# Patient Record
Sex: MALE | Race: OTHER | HISPANIC OR LATINO | URBAN - METROPOLITAN AREA
[De-identification: names, ages, dates, MRNs, and addresses within clinical notes are randomized per-mention and may not be internally consistent; named-entity substitution may affect disease eponyms.]

---

## 2020-04-09 ENCOUNTER — INPATIENT (INPATIENT)
Facility: HOSPITAL | Age: 50
LOS: 7 days | Discharge: HOME | End: 2020-04-17
Attending: HOSPITALIST | Admitting: HOSPITALIST
Payer: COMMERCIAL

## 2020-04-09 VITALS
RESPIRATION RATE: 32 BRPM | HEART RATE: 133 BPM | SYSTOLIC BLOOD PRESSURE: 133 MMHG | DIASTOLIC BLOOD PRESSURE: 81 MMHG | OXYGEN SATURATION: 100 % | TEMPERATURE: 98 F

## 2020-04-09 LAB
ALBUMIN SERPL ELPH-MCNC: 3.5 G/DL — SIGNIFICANT CHANGE UP (ref 3.3–5)
ALP SERPL-CCNC: 69 U/L — SIGNIFICANT CHANGE UP (ref 40–120)
ALT FLD-CCNC: 40 U/L — SIGNIFICANT CHANGE UP (ref 4–41)
ANION GAP SERPL CALC-SCNC: 17 MMO/L — HIGH (ref 7–14)
AST SERPL-CCNC: 51 U/L — HIGH (ref 4–40)
BASOPHILS # BLD AUTO: 0.03 K/UL — SIGNIFICANT CHANGE UP (ref 0–0.2)
BASOPHILS NFR BLD AUTO: 0.2 % — SIGNIFICANT CHANGE UP (ref 0–2)
BILIRUB SERPL-MCNC: 1.1 MG/DL — SIGNIFICANT CHANGE UP (ref 0.2–1.2)
BUN SERPL-MCNC: 21 MG/DL — SIGNIFICANT CHANGE UP (ref 7–23)
CALCIUM SERPL-MCNC: 9 MG/DL — SIGNIFICANT CHANGE UP (ref 8.4–10.5)
CHLORIDE SERPL-SCNC: 92 MMOL/L — LOW (ref 98–107)
CO2 SERPL-SCNC: 22 MMOL/L — SIGNIFICANT CHANGE UP (ref 22–31)
CREAT SERPL-MCNC: 1.26 MG/DL — SIGNIFICANT CHANGE UP (ref 0.5–1.3)
CRP SERPL-MCNC: 291.3 MG/L — HIGH
D DIMER BLD IA.RAPID-MCNC: 4176 NG/ML — SIGNIFICANT CHANGE UP
EOSINOPHIL # BLD AUTO: 0.07 K/UL — SIGNIFICANT CHANGE UP (ref 0–0.5)
EOSINOPHIL NFR BLD AUTO: 0.5 % — SIGNIFICANT CHANGE UP (ref 0–6)
FERRITIN SERPL-MCNC: 1999 NG/ML — HIGH (ref 30–400)
GLUCOSE SERPL-MCNC: 126 MG/DL — HIGH (ref 70–99)
HCT VFR BLD CALC: 46.4 % — SIGNIFICANT CHANGE UP (ref 39–50)
HGB BLD-MCNC: 15.1 G/DL — SIGNIFICANT CHANGE UP (ref 13–17)
IMM GRANULOCYTES NFR BLD AUTO: 0.7 % — SIGNIFICANT CHANGE UP (ref 0–1.5)
LYMPHOCYTES # BLD AUTO: 1.66 K/UL — SIGNIFICANT CHANGE UP (ref 1–3.3)
LYMPHOCYTES # BLD AUTO: 10.9 % — LOW (ref 13–44)
MCHC RBC-ENTMCNC: 28.7 PG — SIGNIFICANT CHANGE UP (ref 27–34)
MCHC RBC-ENTMCNC: 32.5 % — SIGNIFICANT CHANGE UP (ref 32–36)
MCV RBC AUTO: 88 FL — SIGNIFICANT CHANGE UP (ref 80–100)
MONOCYTES # BLD AUTO: 0.98 K/UL — HIGH (ref 0–0.9)
MONOCYTES NFR BLD AUTO: 6.4 % — SIGNIFICANT CHANGE UP (ref 2–14)
NEUTROPHILS # BLD AUTO: 12.37 K/UL — HIGH (ref 1.8–7.4)
NEUTROPHILS NFR BLD AUTO: 81.3 % — HIGH (ref 43–77)
NRBC # FLD: 0 K/UL — SIGNIFICANT CHANGE UP (ref 0–0)
PLATELET # BLD AUTO: 186 K/UL — SIGNIFICANT CHANGE UP (ref 150–400)
PMV BLD: 13.1 FL — HIGH (ref 7–13)
POTASSIUM SERPL-MCNC: 3.9 MMOL/L — SIGNIFICANT CHANGE UP (ref 3.5–5.3)
POTASSIUM SERPL-SCNC: 3.9 MMOL/L — SIGNIFICANT CHANGE UP (ref 3.5–5.3)
PROT SERPL-MCNC: 8.6 G/DL — HIGH (ref 6–8.3)
RBC # BLD: 5.27 M/UL — SIGNIFICANT CHANGE UP (ref 4.2–5.8)
RBC # FLD: 13.3 % — SIGNIFICANT CHANGE UP (ref 10.3–14.5)
SODIUM SERPL-SCNC: 131 MMOL/L — LOW (ref 135–145)
WBC # BLD: 15.21 K/UL — HIGH (ref 3.8–10.5)
WBC # FLD AUTO: 15.21 K/UL — HIGH (ref 3.8–10.5)

## 2020-04-09 PROCEDURE — 71045 X-RAY EXAM CHEST 1 VIEW: CPT | Mod: 26

## 2020-04-09 RX ORDER — ACETAMINOPHEN 500 MG
650 TABLET ORAL ONCE
Refills: 0 | Status: COMPLETED | OUTPATIENT
Start: 2020-04-09 | End: 2020-04-09

## 2020-04-09 RX ADMIN — Medication 650 MILLIGRAM(S): at 21:05

## 2020-04-09 NOTE — ED ADULT NURSE NOTE - OBJECTIVE STATEMENT
md HPI Objective Statement: 50 y/o male with a PMHx of psoriasis on monthly immunosuppressant injections presents to the ED with c/o x 3 weeks of cough, fever, SOB, and N/V/D. Pt denies any abdominal pain or chest pain. Pt notes approximately x 3 weeks ago return from [place] and states symptoms started around that time. Pt admits to smoking x 1-2 cigarettes a day, however has not smoked in the last week or so.    moved to room 8 -  placed on pulse ox, remains on 100% nrb -tylenol given, iv placed, bloods drawn and sent

## 2020-04-09 NOTE — ED PROVIDER NOTE - OBJECTIVE STATEMENT
48 y/o male with a PMHx of psoriasis on monthly immunosuppressant injections presents to the ED with c/o x 3 weeks of cough, fever, SOB, and N/V/D. Pt denies any abdominal pain or chest pain. Pt notes approximately x 3 weeks ago return from [place] and states symptoms started around that time. Pt admits to smoking x 1-2 cigarettes a day, however has not smoked in the last week or so.

## 2020-04-09 NOTE — ED PROVIDER NOTE - CLINICAL SUMMARY MEDICAL DECISION MAKING FREE TEXT BOX
50 y/o middle age male on monthly immunosuppressant injections for psoriasis p/w x 3-4 weeks of flu like symptoms; concern for COVID. Placed on 4L NC for hypoxia continuing to be tachypneic breathing in the 40s; will continue to monitor and admit. 50 y/o middle age male on monthly immunosuppressant injections for psoriasis p/w x 3-4 weeks of flu like symptoms; concern for COVID. Placed on NRB for hypoxia continuing to be tachypneic breathing in the 40s; will continue to monitor and admit.

## 2020-04-10 DIAGNOSIS — J12.9 VIRAL PNEUMONIA, UNSPECIFIED: ICD-10-CM

## 2020-04-10 DIAGNOSIS — I26.92 SADDLE EMBOLUS OF PULMONARY ARTERY WITHOUT ACUTE COR PULMONALE: ICD-10-CM

## 2020-04-10 DIAGNOSIS — Z29.9 ENCOUNTER FOR PROPHYLACTIC MEASURES, UNSPECIFIED: ICD-10-CM

## 2020-04-10 DIAGNOSIS — Z02.9 ENCOUNTER FOR ADMINISTRATIVE EXAMINATIONS, UNSPECIFIED: ICD-10-CM

## 2020-04-10 DIAGNOSIS — R68.89 OTHER GENERAL SYMPTOMS AND SIGNS: ICD-10-CM

## 2020-04-10 LAB
ALBUMIN SERPL ELPH-MCNC: 3.5 G/DL — SIGNIFICANT CHANGE UP (ref 3.3–5)
ALP SERPL-CCNC: 78 U/L — SIGNIFICANT CHANGE UP (ref 40–120)
ALT FLD-CCNC: 36 U/L — SIGNIFICANT CHANGE UP (ref 4–41)
ANION GAP SERPL CALC-SCNC: 20 MMO/L — HIGH (ref 7–14)
APTT BLD: 101.7 SEC — HIGH (ref 27.5–36.3)
APTT BLD: 177.6 SEC — CRITICAL HIGH (ref 27.5–36.3)
APTT BLD: 42.4 SEC — HIGH (ref 27.5–36.3)
AST SERPL-CCNC: 50 U/L — HIGH (ref 4–40)
BASOPHILS # BLD AUTO: 0.03 K/UL — SIGNIFICANT CHANGE UP (ref 0–0.2)
BASOPHILS NFR BLD AUTO: 0.2 % — SIGNIFICANT CHANGE UP (ref 0–2)
BILIRUB SERPL-MCNC: 1.1 MG/DL — SIGNIFICANT CHANGE UP (ref 0.2–1.2)
BUN SERPL-MCNC: 22 MG/DL — SIGNIFICANT CHANGE UP (ref 7–23)
CALCIUM SERPL-MCNC: 9 MG/DL — SIGNIFICANT CHANGE UP (ref 8.4–10.5)
CHLORIDE SERPL-SCNC: 94 MMOL/L — LOW (ref 98–107)
CO2 SERPL-SCNC: 20 MMOL/L — LOW (ref 22–31)
CREAT SERPL-MCNC: 1.08 MG/DL — SIGNIFICANT CHANGE UP (ref 0.5–1.3)
EOSINOPHIL # BLD AUTO: 0.02 K/UL — SIGNIFICANT CHANGE UP (ref 0–0.5)
EOSINOPHIL NFR BLD AUTO: 0.1 % — SIGNIFICANT CHANGE UP (ref 0–6)
GLUCOSE SERPL-MCNC: 150 MG/DL — HIGH (ref 70–99)
HCT VFR BLD CALC: 38.8 % — LOW (ref 39–50)
HCT VFR BLD CALC: 41.1 % — SIGNIFICANT CHANGE UP (ref 39–50)
HGB BLD-MCNC: 12.9 G/DL — LOW (ref 13–17)
HGB BLD-MCNC: 13.8 G/DL — SIGNIFICANT CHANGE UP (ref 13–17)
IMM GRANULOCYTES NFR BLD AUTO: 0.8 % — SIGNIFICANT CHANGE UP (ref 0–1.5)
INR BLD: 1.65 — HIGH (ref 0.88–1.17)
LACTATE SERPL-SCNC: 1.7 MMOL/L — SIGNIFICANT CHANGE UP (ref 0.5–2)
LDH SERPL L TO P-CCNC: 547 U/L — HIGH (ref 135–225)
LDH SERPL L TO P-CCNC: 669 U/L — HIGH (ref 135–225)
LYMPHOCYTES # BLD AUTO: 1 K/UL — SIGNIFICANT CHANGE UP (ref 1–3.3)
LYMPHOCYTES # BLD AUTO: 7.2 % — LOW (ref 13–44)
MAGNESIUM SERPL-MCNC: 2.7 MG/DL — HIGH (ref 1.6–2.6)
MCHC RBC-ENTMCNC: 28.8 PG — SIGNIFICANT CHANGE UP (ref 27–34)
MCHC RBC-ENTMCNC: 29.1 PG — SIGNIFICANT CHANGE UP (ref 27–34)
MCHC RBC-ENTMCNC: 33.2 % — SIGNIFICANT CHANGE UP (ref 32–36)
MCHC RBC-ENTMCNC: 33.6 % — SIGNIFICANT CHANGE UP (ref 32–36)
MCV RBC AUTO: 86.5 FL — SIGNIFICANT CHANGE UP (ref 80–100)
MCV RBC AUTO: 86.6 FL — SIGNIFICANT CHANGE UP (ref 80–100)
MONOCYTES # BLD AUTO: 0.27 K/UL — SIGNIFICANT CHANGE UP (ref 0–0.9)
MONOCYTES NFR BLD AUTO: 1.9 % — LOW (ref 2–14)
NEUTROPHILS # BLD AUTO: 12.42 K/UL — HIGH (ref 1.8–7.4)
NEUTROPHILS NFR BLD AUTO: 89.8 % — HIGH (ref 43–77)
NRBC # FLD: 0 K/UL — SIGNIFICANT CHANGE UP (ref 0–0)
NRBC # FLD: 0 K/UL — SIGNIFICANT CHANGE UP (ref 0–0)
PHOSPHATE SERPL-MCNC: 3.1 MG/DL — SIGNIFICANT CHANGE UP (ref 2.5–4.5)
PLATELET # BLD AUTO: 177 K/UL — SIGNIFICANT CHANGE UP (ref 150–400)
PLATELET # BLD AUTO: 179 K/UL — SIGNIFICANT CHANGE UP (ref 150–400)
PMV BLD: 12.5 FL — SIGNIFICANT CHANGE UP (ref 7–13)
PMV BLD: 13.5 FL — HIGH (ref 7–13)
POTASSIUM SERPL-MCNC: 5.1 MMOL/L — SIGNIFICANT CHANGE UP (ref 3.5–5.3)
POTASSIUM SERPL-SCNC: 5.1 MMOL/L — SIGNIFICANT CHANGE UP (ref 3.5–5.3)
PROCALCITONIN SERPL-MCNC: 0.28 NG/ML — HIGH (ref 0.02–0.1)
PROCALCITONIN SERPL-MCNC: 0.32 NG/ML — HIGH (ref 0.02–0.1)
PROT SERPL-MCNC: 7.6 G/DL — SIGNIFICANT CHANGE UP (ref 6–8.3)
PROTHROM AB SERPL-ACNC: 19.1 SEC — HIGH (ref 9.8–13.1)
RBC # BLD: 4.48 M/UL — SIGNIFICANT CHANGE UP (ref 4.2–5.8)
RBC # BLD: 4.75 M/UL — SIGNIFICANT CHANGE UP (ref 4.2–5.8)
RBC # FLD: 13.1 % — SIGNIFICANT CHANGE UP (ref 10.3–14.5)
RBC # FLD: 13.2 % — SIGNIFICANT CHANGE UP (ref 10.3–14.5)
SARS-COV-2 RNA SPEC QL NAA+PROBE: SIGNIFICANT CHANGE UP
SODIUM SERPL-SCNC: 134 MMOL/L — LOW (ref 135–145)
TROPONIN T, HIGH SENSITIVITY: < 6 NG/L — SIGNIFICANT CHANGE UP (ref ?–14)
WBC # BLD: 13.85 K/UL — HIGH (ref 3.8–10.5)
WBC # BLD: 14.27 K/UL — HIGH (ref 3.8–10.5)
WBC # FLD AUTO: 13.85 K/UL — HIGH (ref 3.8–10.5)
WBC # FLD AUTO: 14.27 K/UL — HIGH (ref 3.8–10.5)

## 2020-04-10 PROCEDURE — 99223 1ST HOSP IP/OBS HIGH 75: CPT | Mod: GC

## 2020-04-10 PROCEDURE — 71275 CT ANGIOGRAPHY CHEST: CPT | Mod: 26

## 2020-04-10 PROCEDURE — 12345: CPT | Mod: NC

## 2020-04-10 RX ORDER — HEPARIN SODIUM 5000 [USP'U]/ML
5000 INJECTION INTRAVENOUS; SUBCUTANEOUS EVERY 6 HOURS
Refills: 0 | Status: DISCONTINUED | OUTPATIENT
Start: 2020-04-10 | End: 2020-04-14

## 2020-04-10 RX ORDER — HEPARIN SODIUM 5000 [USP'U]/ML
10000 INJECTION INTRAVENOUS; SUBCUTANEOUS ONCE
Refills: 0 | Status: COMPLETED | OUTPATIENT
Start: 2020-04-10 | End: 2020-04-10

## 2020-04-10 RX ORDER — HEPARIN SODIUM 5000 [USP'U]/ML
INJECTION INTRAVENOUS; SUBCUTANEOUS
Qty: 25000 | Refills: 0 | Status: DISCONTINUED | OUTPATIENT
Start: 2020-04-10 | End: 2020-04-14

## 2020-04-10 RX ORDER — SODIUM CHLORIDE 9 MG/ML
500 INJECTION INTRAMUSCULAR; INTRAVENOUS; SUBCUTANEOUS ONCE
Refills: 0 | Status: COMPLETED | OUTPATIENT
Start: 2020-04-10 | End: 2020-04-10

## 2020-04-10 RX ORDER — HEPARIN SODIUM 5000 [USP'U]/ML
10000 INJECTION INTRAVENOUS; SUBCUTANEOUS EVERY 6 HOURS
Refills: 0 | Status: DISCONTINUED | OUTPATIENT
Start: 2020-04-10 | End: 2020-04-14

## 2020-04-10 RX ADMIN — HEPARIN SODIUM 1900 UNIT(S)/HR: 5000 INJECTION INTRAVENOUS; SUBCUTANEOUS at 14:53

## 2020-04-10 RX ADMIN — HEPARIN SODIUM 10000 UNIT(S): 5000 INJECTION INTRAVENOUS; SUBCUTANEOUS at 06:47

## 2020-04-10 RX ADMIN — HEPARIN SODIUM 1600 UNIT(S)/HR: 5000 INJECTION INTRAVENOUS; SUBCUTANEOUS at 21:58

## 2020-04-10 RX ADMIN — HEPARIN SODIUM 2300 UNIT(S)/HR: 5000 INJECTION INTRAVENOUS; SUBCUTANEOUS at 06:48

## 2020-04-10 RX ADMIN — SODIUM CHLORIDE 1000 MILLILITER(S): 9 INJECTION INTRAMUSCULAR; INTRAVENOUS; SUBCUTANEOUS at 05:14

## 2020-04-10 RX ADMIN — HEPARIN SODIUM 0 UNIT(S)/HR: 5000 INJECTION INTRAVENOUS; SUBCUTANEOUS at 13:51

## 2020-04-10 RX ADMIN — Medication 40 MILLIGRAM(S): at 00:21

## 2020-04-10 NOTE — H&P ADULT - NSHPREVIEWOFSYSTEMS_GEN_ALL_CORE
REVIEW OF SYSTEMS:  CONSTITUTIONAL: SEE HPI  EYES: No eye pain, visual disturbances, or discharge  ENT:  No difficulty hearing, tinnitus, vertigo; No sinus or throat pain  NECK: No pain or stiffness  RESPIRATORY: SEE HPI  CARDIOVASCULAR: No chest pain, palpitations, dizziness, or leg swelling  GASTROINTESTINAL: SEE HPI  GENITOURINARY: No dysuria, frequency, hematuria, or incontinence  NEUROLOGICAL: No headaches, memory loss, loss of strength, numbness, or tremors  SKIN: No itching, burning, rashes, or lesions   LYMPH NODES: No enlarged glands  ENDOCRINE: No hot or cold intolerance; No hair loss  MUSCULOSKELETAL: No joint pain or swelling; No muscle, back, or extremity pain  PSYCHIATRIC: No depression, anxiety, mood swings, or difficulty sleeping  HEME/LYMPH: No easy bruising, or bleeding gums  ALLERGY AND IMMUNOLOGIC: No hives or eczema

## 2020-04-10 NOTE — H&P ADULT - HISTORY OF PRESENT ILLNESS
49M PMH psoriasis on monthly immunosuppressant injections of who presents with shortness of breath. Patient has been experiencing with cough productive of phlegm, fever, SOB for the past three week. Patient also experienced multiple episodes of diarrhea during this time. He also experienced chest pain when he coughed. He denies abdominal pain. He recently traveled to [place] in March and developed these symptoms after his arrival. He lives with his wife and three children. His wife and son are currently ill. He came to the ED for an evaluation.    In the ED, he was tachycardic to the 133 and tachypneic to 32. He received solumedrol 40 mg x1 and acetaminophen 650 mg x1.

## 2020-04-10 NOTE — PROGRESS NOTE ADULT - PROBLEM SELECTOR PLAN 3
CXR with patchy ground-glass opacities and RLL consolidation.  - patient on supplemental oxygen, monitor respiratory status closely  - continue with supportive care

## 2020-04-10 NOTE — CONSULT NOTE ADULT - ATTENDING COMMENTS
Pt seen and examined with resident  Fever and cough x 7-10 days, s/p azithro x5d, progressive SOB, CARRERA over last week.  Came to ED for progression of symptoms without any sudden change.  CTA shows extensive bilat and saddle PE  POCUS: suboptimal windows, RV appears about equal in size to LV, no septal bowing or flattening, LVF appears normal  Labs signif for , Ferritin 2000, d dimer 4170  Pt lying flat in stretcher, NARD, RR 18 unlabored, O2 sat on RA 94%, /70, , alert, orientedx3  Extensive bilat and saddle PE likely COVID infection and related hypercoaguability  Hemodynamically stable and not hypoxic  Elevated inflamm markers  - does not require tPA at this time; recommend heparin gtt; if he becomes hemodynamically unstable, can reconsider tPA  - f/u COVID test; if + would consider tocilizumab or Anakinra given elevated inflamm markers and PE's  - TTE, LE dopplers  - low flow O2 via NC to maintain O2sat >89%  Pt does not require MICU admission

## 2020-04-10 NOTE — CONSULT NOTE ADULT - ASSESSMENT
50 yo M PMH of psoriasis p/w fever, cough, SOB found to have elevated CRP, ferritin w/ CTA chest showing extensive b/l PE w/ saddle thrombus and b/l GGO.     - given pt w/ fever, dry cough, elevated inflammatory markers likely PE in setting of hypercoagulabilty from underlying COVID infection  - pt HD stable, BP systolic range 110-130, mild tachycardia  - on arrival pt on NRB sat 100%, on RA sat 92 in NAD  - bedside TTE showed mild RV dilation however no septal wall flattening, no TR, no RV strain   - pt does not require TPA at this time w/o signs of massive PE, should be started on full AC w/ heparin gtt  - pt will need a formal TTE  - monitor on telemetry  - pt does not require MICU care at this time

## 2020-04-10 NOTE — ED ADULT NURSE REASSESSMENT NOTE - NS ED NURSE REASSESS COMMENT FT1
Awaiting transport. Pt stable w/ no retractions noted. Sleeping comfrotably and easily arousible on 15L non rebreather

## 2020-04-10 NOTE — H&P ADULT - ATTENDING COMMENTS
Case discussed in detail with admitting housestaff. In summary, 49M PMH psoriasis on monthly immunosuppressant injections of who presents with shortness of breath, concern for COVID-19 infection, CTA chest also with saddle pulmonary emboli. For PE, will need tele monitoring. Check EKG and TTE. Started on AC (given likely CAILIN, started on Hep gtt for now). Will give 500cc IVF given hyponatremia and elevated Cr. Closely monitoring resp status. Oxygen support as needed. Check COVID pcr and cultures. Low threshold to start hydroxychloroquine once EKG QTS available.

## 2020-04-10 NOTE — H&P ADULT - PROBLEM SELECTOR PLAN 2
CTA chest with extensive bilateral pulmonary emboli with saddle thrombus identified. Patient also tachycardic to 133.  - MICU consulted for evaluation; follow up recs  - follow up EKG and troponins  - will begin heparin gtt for full anticoagulation  - TTE ordered  - monitor on telemetry CTA chest with extensive bilateral pulmonary emboli with saddle thrombus identified. Patient also tachycardic to 133.  - etiology likely 2/2 hypercoagulability in setting of COVID-19 infection vs underlying primary hypercoagulability state  - as per MICU, bedside TTE showed mild RV dilation however no septal wall flattening, no TR, no RV strain; not a MICU candidate at this time  - follow up EKG and troponins  - will begin heparin gtt for full anticoagulation  - TTE ordered  - monitor on telemetry

## 2020-04-10 NOTE — PROVIDER CONTACT NOTE (CRITICAL VALUE NOTIFICATION) - SITUATION
Patient on a heparin drip with a ptt of 177.6 Cheilitis Aggressive Treatment: I recommended application of Vaseline or Aquaphor numerous times a day (as often as every hour) and before going to bed. I also prescribed a topical steroid for twice daily use.

## 2020-04-10 NOTE — PROGRESS NOTE ADULT - SUBJECTIVE AND OBJECTIVE BOX
Patient is a 49y old  Male who presents with a chief complaint of Shortness of breath (10 Apr 2020 03:36)    SUBJECTIVE / OVERNIGHT EVENTS: Pt seen and examined earlier this am, no overnight events, afebrile, pt denies any cough or sob at this time, comfortable on nasal canula oxygen, had some cough earlier with some streaks of blood in it, no other complaints. No bleeding or any other issues reported by nsg.    MEDICATIONS  (STANDING):  heparin  Infusion.  Unit(s)/Hr (23 mL/Hr) IV Continuous <Continuous>    MEDICATIONS  (PRN):  heparin  Injectable 67381 Unit(s) IV Push every 6 hours PRN For aPTT less than 40  heparin  Injectable 5000 Unit(s) IV Push every 6 hours PRN For aPTT between 40 - 57      Vital Signs Last 24 Hrs  T(C): 36.4 (10 Apr 2020 12:37), Max: 37 (10 Apr 2020 00:09)  T(F): 97.6 (10 Apr 2020 12:37), Max: 98.6 (10 Apr 2020 00:09)  HR: 93 (10 Apr 2020 12:37) (93 - 133)  BP: 115/74 (10 Apr 2020 12:37) (115/74 - 133/81)  BP(mean): --  RR: 22 (10 Apr 2020 12:37) (18 - 32)  SpO2: 96% (10 Apr 2020 12:37) (95% - 100%)  CAPILLARY BLOOD GLUCOSE        I&O's Summary      PHYSICAL EXAM:  GENERAL: NAD, well developed  CHEST/LUNG: No respiratory distress, no accessory muscle use, on nasal cannula oxygen  CVS: + tachycardia on vital signs  ABDOMEN: Soft, non tender, Nondistended  EXTREMITIES: 1+ edema b/l LE  PSYCH: Calm  NEUROLOGY: A/Ox3      LABS:                        12.9   14.27 )-----------( 179      ( 10 Apr 2020 12:33 )             38.8     04-10    134<L>  |  94<L>  |  22  ----------------------------<  150<H>  5.1   |  20<L>  |  1.08    Ca    9.0      10 Apr 2020 04:30  Phos  3.1     04-10  Mg     2.7     04-10    TPro  7.6  /  Alb  3.5  /  TBili  1.1  /  DBili  x   /  AST  50<H>  /  ALT  36  /  AlkPhos  78  04-10    PT/INR - ( 10 Apr 2020 04:30 )   PT: 19.1 SEC;   INR: 1.65          PTT - ( 10 Apr 2020 12:33 )  PTT:177.6 SEC            C-Reactive Protein, Serum: 291.3 mg/L (04-09-20 @ 20:15)      D-Dimer Assay, Quantitative: 4176 ng/mL (04-09-20 @ 20:15)    Ferritin, Serum: 1999 ng/mL (04-09-20 @ 20:15)      RADIOLOGY & ADDITIONAL TESTS:    Imaging Personally Reviewed:    Care Discussed with Consultants/Other Providers:

## 2020-04-10 NOTE — CHART NOTE - NSCHARTNOTEFT_GEN_A_CORE
I was called by radiology to report a CTPA that showed a saddle PE including all lobes except the EMORY and evidence of Covid 19 infection.  Pt is currently on a NRB mask.    ICU Vital Signs Last 24 Hrs  T(C): 37 (10 Apr 2020 00:09), Max: 37 (10 Apr 2020 00:09)  T(F): 98.6 (10 Apr 2020 00:09), Max: 98.6 (10 Apr 2020 00:09)  HR: 110 (10 Apr 2020 00:09) (110 - 133)  BP: 117/76 (10 Apr 2020 00:09) (117/76 - 133/81)  BP(mean): --  ABP: --  ABP(mean): --  RR: 18 (10 Apr 2020 00:09) (18 - 32)  SpO2: 98% (10 Apr 2020 00:09) (98% - 100%)    A/P:  Further evaluation to be done by Hospitalist  MICU consulted for evaluation and going to see pt now  Pt weight needed and then IV Heparin as full AC will be ordered unless MICU recommends different management  Possible CTS consult if determined appropriated on further evaluation.  Telemetery  TTE  Close monitoring

## 2020-04-10 NOTE — H&P ADULT - ASSESSMENT
49M PMH psoriasis on monthly immunosuppressant injections of who presents with shortness of breath, concern for COVID-19 infection, CTA chest also with saddle pulmonary emboli, admitted for further management.

## 2020-04-10 NOTE — PROGRESS NOTE ADULT - PROBLEM SELECTOR PLAN 1
Patient presents with complaint of fever, shortness of breath, and diarrhea. WBC 15.21, D-dimer 4176, ferritin 1999, .3.  - COVID-19 PCR is negative, discussed with ID attending Dr. Shirley will repeat covid  - continue with supportive care  - trend CBC daily and monitor vitals

## 2020-04-10 NOTE — H&P ADULT - NSHPSOCIALHISTORY_GEN_ALL_CORE
The patient smokes 1-2 cigarettes per day at times and has done so for the past ten years. He denies drug and alcohol use.

## 2020-04-10 NOTE — CONSULT NOTE ADULT - SUBJECTIVE AND OBJECTIVE BOX
CHIEF COMPLAINT:    HPI:    50 yo M PMH of psoriasis p/w fever, cough, SOB. Pt reports being in normal state of health 2 weeks prior, at that time returned from flight from [place] Stated started having fever 102 max in [place] with worsening dry cough. Last week developed GI symptoms with diarrhea. Was given course of azithromax last week from outside provider without any relief. Pt reports over last few days worsening CARRERA, unable to walk more than 5 feet without SOB, no SOB at rest, no associated CP or palpitations. Denies any positive COVID contacts, works for [employer]. No sore throat, rhinorrhea, N/V. No history of blood clots, reports possible history in grandmother. No recent surgery. No Lower extremity swelling. Pt reports taking Cosentyx medication for psoriasis, last injection around 10 days prior, not first time administering medication. Denies any other pulmonary history (no asthma, KEYANA, COPD) or cardiac history (PCI, valve replacement)    PAST MEDICAL & SURGICAL HISTORY:  Psoriasis  No significant past surgical history      FAMILY HISTORY:  No pertinent family history in first degree relatives      SOCIAL HISTORY:  Smokin-2 cigarettes per day  EtOH Use:  Marital Status:   Occupation:  Recent Travel:  Country of Birth:  Advance Directives:    Allergies    No Known Allergies    Intolerances        HOME MEDICATIONS:    Cosenytx.     CONSTITUTIONAL: No weakness, + fevers or chills  EYES/ENT: No visual changes;  No vertigo or throat pain   NECK: No pain or stiffness  RESPIRATORY: +cough, wheezing, hemoptysis; +shortness of breath  CARDIOVASCULAR: No chest pain or palpitations  GASTROINTESTINAL: No abdominal or epigastric pain. No nausea, vomiting, or hematemesis; No diarrhea or constipation. No melena or hematochezia.  GENITOURINARY: No dysuria, frequency or hematuria  NEUROLOGICAL: No numbness or weakness  SKIN: No itching, burning, rashes, or lesions   All other review of systems is negative unless indicated above.    [ ] All other systems negative  [ ] Unable to assess ROS because ________    OBJECTIVE:  ICU Vital Signs Last 24 Hrs  T(C): 37 (10 Apr 2020 00:09), Max: 37 (10 Apr 2020 00:09)  T(F): 98.6 (10 Apr 2020 00:09), Max: 98.6 (10 Apr 2020 00:09)  HR: 110 (10 Apr 2020 00:09) (110 - 133)  BP: 117/76 (10 Apr 2020 00:09) (117/76 - 133/81)  BP(mean): --  ABP: --  ABP(mean): --  RR: 18 (10 Apr 2020 00:09) (18 - 32)  SpO2: 98% (10 Apr 2020 00:09) (98% - 100%)        CAPILLARY BLOOD GLUCOSE          PHYSICAL EXAM:  General: Awake, alert, oriented X 3.   HEENT: Atraumatic, normocephalic.   Neck: No JVD. No carotid bruit.   Respiratory: CTABL  Cardiovascular: S1 S2 normal.Abdomen: Soft, non-tender, non-distended. No organomegaly.  Extremities: Warm to touch. 1+ pitting edema noted in both lower extremities this am.  Skin: No rashes or skin lesions  Neurological: Motor and sensory examination equal and normal in all four extremities.  Psychiatry: Appropriate mood and affect.  HOSPITAL MEDICATIONS:  MEDICATIONS  (STANDING):    MEDICATIONS  (PRN):      LABS:                        15.1   15. )-----------( 186      ( 2020 20:15 )             46.4     04    131<L>  |  92<L>  |  21  ----------------------------<  126<H>  3.9   |  22  |  1.26    Ca    9.0      2020 20:15    TPro  8.6<H>  /  Alb  3.5  /  TBili  1.1  /  DBili  x   /  AST  51<H>  /  ALT  40  /  AlkPhos  69  -              MICROBIOLOGY:     RADIOLOGY:    < from: CT Angio Chest w/ IV Cont (04.10.20 @ 01:12) >  Patchy/groundglass peripheral bilateral opacities (GGO) with basilar predominance. Suggestion of right lower lobe consolidation. Differential includes atypical pneumonia/viral infection from agents including COVID-19. Correlate with clinical parameters and laboratory values for confirmation.    Extensive bilateral pulmonary emboli with saddle thrombus identified.    < end of copied text >      [x ] Reviewed and interpreted by me    EKG:

## 2020-04-10 NOTE — CHART NOTE - NSCHARTNOTEFT_GEN_A_CORE
pt was COVID negative x 1 -     Sp02 98% on 2L NC pt was asymptomatic laying comfortably supine in bed in NAD   pt was reswabbed for repeat COVID - sample testing - will continue to monitor       Vital Signs Last 24 Hrs  T(C): 36.6 (10 Apr 2020 14:44), Max: 36.7 (10 Apr 2020 04:48)  T(F): 97.8 (10 Apr 2020 14:44), Max: 98 (10 Apr 2020 04:48)  HR: 87 (10 Apr 2020 14:44) (87 - 99)  BP: 122/83 (10 Apr 2020 14:44) (115/74 - 122/83)  BP(mean): --  RR: 20 (10 Apr 2020 14:44) (20 - 22)  SpO2: 98% (10 Apr 2020 14:44) (95% - 98%)

## 2020-04-10 NOTE — H&P ADULT - PROBLEM SELECTOR PLAN 1
Patient presents with complaint of fever, shortness of breath, and diarrhea. WBC 15.21, D-dimer 4176, ferritin 1999, .3.  - follow up LDH and procalcitonin  - follow up COVID-19 PCR  - continue with supportive care  - trend CBC daily and monitor vitals

## 2020-04-10 NOTE — H&P ADULT - NSHPLABSRESULTS_GEN_ALL_CORE
LABS:                          15.1   15.21 )-----------( 186      ( 09 Apr 2020 20:15 )             46.4     Hb Trend: 15.1<--  WBC Trend: 15.21<--  Plt Trend: 186<--          04-09    131<L>  |  92<L>  |  21  ----------------------------<  126<H>  3.9   |  22  |  1.26    Ca    9.0      09 Apr 2020 20:15    TPro  8.6<H>  /  Alb  3.5  /  TBili  1.1  /  DBili  x   /  AST  51<H>  /  ALT  40  /  AlkPhos  69  04-09          CAPILLARY BLOOD GLUCOSE              IMAGING:      EXAM:  XR CHEST PORTABLE IMMED 1V    PROCEDURE DATE:  Apr 9 2020     ******PRELIMINARY REPORT******    ******PRELIMINARY REPORT******          INTERPRETATION:  Patchy/hazy bilateral opacities. Pattern suggests infection including atypical pneumonia/viral infection from atypical agents including COVID-19.    Follow up final report.      EXAM:  CT ANGIO CHEST (W)AW IC    PROCEDURE DATE:  Apr 10 2020     IMPRESSION:     Patchy/groundglass peripheral bilateral opacities (GGO) with basilar predominance. Suggestion of right lower lobe consolidation. Differential includes atypical pneumonia/viral infection from agents including COVID-19. Correlate with clinical parameters and laboratory values for confirmation.    Extensive bilateral pulmonary emboli with saddle thrombus identified.    These findings were discussed with WINSOME Wooten at 4/10/2020 1:14 AM by Dr. Arora with read back confirmation.    Imaging studies reviewed by me.

## 2020-04-10 NOTE — H&P ADULT - NSHPPHYSICALEXAM_GEN_ALL_CORE
Vital Signs Last 24 Hrs  T(C): 37 (10 Apr 2020 00:09), Max: 37 (10 Apr 2020 00:09)  T(F): 98.6 (10 Apr 2020 00:09), Max: 98.6 (10 Apr 2020 00:09)  HR: 110 (10 Apr 2020 00:09) (110 - 133)  BP: 117/76 (10 Apr 2020 00:09) (117/76 - 133/81)  BP(mean): --  RR: 18 (10 Apr 2020 00:09) (18 - 32)  SpO2: 98% (10 Apr 2020 00:09) (98% - 100%)    General: NAD  Head: Normocephalic, Atraumatic  Eyes: PERRLA, EOMI, normal sclera  Throat: Moist mucous membranes  Respiratory: Tachycardic, regular rhythm, normal respiratory effort, no wheezes, crackles, rales  CV: RRR, S1/S2, no murmurs, rubs or gallops  Abdominal: Soft, bowel sounds present, NT, ND  Extremities: No edema, 2+ DP pulses  Neurological: A&Ox4, MAEx4, non-focal  Skin: No rashes Vital Signs Last 24 Hrs  T(C): 37 (10 Apr 2020 00:09), Max: 37 (10 Apr 2020 00:09)  T(F): 98.6 (10 Apr 2020 00:09), Max: 98.6 (10 Apr 2020 00:09)  HR: 110 (10 Apr 2020 00:09) (110 - 133)  BP: 117/76 (10 Apr 2020 00:09) (117/76 - 133/81)  BP(mean): --  RR: 18 (10 Apr 2020 00:09) (18 - 32)  SpO2: 98% (10 Apr 2020 00:09) (98% - 100%)    General: NAD  Head: Normocephalic, Atraumatic  Eyes: PERRLA, EOMI, normal sclera  Throat: Moist mucous membranes  Respiratory: coarse breath sounds, normal respiratory effort, no wheezes, crackles, rales  CV: Tachycardic, regular rhythm, S1/S2, no murmurs, rubs or gallops  Abdominal: Soft, bowel sounds present, NT, ND  Extremities: No edema, 2+ DP pulses  Neurological: A&Ox4, MAEx4, non-focal  Skin: No rashes

## 2020-04-10 NOTE — H&P ADULT - PROBLEM SELECTOR PLAN 3
CXR with patchy ground-glass opacities and RLL consolidation.  - patient not requiring supplemental oxygen  - continue with supportive care

## 2020-04-10 NOTE — PROGRESS NOTE ADULT - PROBLEM SELECTOR PLAN 2
CTA chest with extensive bilateral pulmonary emboli with saddle thrombus identified. Patient also tachycardic  - etiology likely 2/2 hypercoagulability in setting of COVID-19 infection vs underlying primary hypercoagulability state  - as per MICU, bedside TTE showed mild RV dilation however no septal wall flattening, no TR, no RV strain; not a MICU candidate at this time  - follow up EKG and troponins  - cont heparin gtt for full anticoagulation  - TTE and LE doppler pending  - monitor on telemetry awaiting tele bed

## 2020-04-10 NOTE — ED ADULT NURSE REASSESSMENT NOTE - NS ED NURSE REASSESS COMMENT FT1
Pt on non-rebreather. not hypoxic. No retractions. No increased WOB noted. Immediate de-saturation noted off O2. Pt educated on need to remain in place. Will continue to assess Vitals stbale.

## 2020-04-11 LAB
APTT BLD: 76.9 SEC — HIGH (ref 27.5–36.3)
APTT BLD: 85 SEC — HIGH (ref 27.5–36.3)
HCT VFR BLD CALC: 38 % — LOW (ref 39–50)
HGB BLD-MCNC: 12.7 G/DL — LOW (ref 13–17)
MCHC RBC-ENTMCNC: 29 PG — SIGNIFICANT CHANGE UP (ref 27–34)
MCHC RBC-ENTMCNC: 33.4 % — SIGNIFICANT CHANGE UP (ref 32–36)
MCV RBC AUTO: 86.8 FL — SIGNIFICANT CHANGE UP (ref 80–100)
NRBC # FLD: 0 K/UL — SIGNIFICANT CHANGE UP (ref 0–0)
PLATELET # BLD AUTO: 198 K/UL — SIGNIFICANT CHANGE UP (ref 150–400)
PMV BLD: 13.3 FL — HIGH (ref 7–13)
RBC # BLD: 4.38 M/UL — SIGNIFICANT CHANGE UP (ref 4.2–5.8)
RBC # FLD: 13.2 % — SIGNIFICANT CHANGE UP (ref 10.3–14.5)
SARS-COV-2 RNA SPEC QL NAA+PROBE: SIGNIFICANT CHANGE UP
WBC # BLD: 14.29 K/UL — HIGH (ref 3.8–10.5)
WBC # FLD AUTO: 14.29 K/UL — HIGH (ref 3.8–10.5)

## 2020-04-11 PROCEDURE — 99233 SBSQ HOSP IP/OBS HIGH 50: CPT

## 2020-04-11 RX ORDER — CEFTRIAXONE 500 MG/1
1000 INJECTION, POWDER, FOR SOLUTION INTRAMUSCULAR; INTRAVENOUS EVERY 24 HOURS
Refills: 0 | Status: COMPLETED | OUTPATIENT
Start: 2020-04-11 | End: 2020-04-15

## 2020-04-11 RX ORDER — AZITHROMYCIN 500 MG/1
500 TABLET, FILM COATED ORAL DAILY
Refills: 0 | Status: DISCONTINUED | OUTPATIENT
Start: 2020-04-11 | End: 2020-04-15

## 2020-04-11 RX ADMIN — HEPARIN SODIUM 1600 UNIT(S)/HR: 5000 INJECTION INTRAVENOUS; SUBCUTANEOUS at 15:14

## 2020-04-11 RX ADMIN — CEFTRIAXONE 100 MILLIGRAM(S): 500 INJECTION, POWDER, FOR SOLUTION INTRAMUSCULAR; INTRAVENOUS at 16:40

## 2020-04-11 RX ADMIN — HEPARIN SODIUM 1600 UNIT(S)/HR: 5000 INJECTION INTRAVENOUS; SUBCUTANEOUS at 08:41

## 2020-04-11 RX ADMIN — AZITHROMYCIN 500 MILLIGRAM(S): 500 TABLET, FILM COATED ORAL at 17:17

## 2020-04-11 NOTE — PROGRESS NOTE ADULT - SUBJECTIVE AND OBJECTIVE BOX
[facility] Division of Hospital Medicine  Joanne Anderson MD  Pager [phone]      Patient is a 49y old  Male who presents with a chief complaint of Shortness of breath (10 Apr 2020 14:25)      SUBJECTIVE / OVERNIGHT EVENTS:    afebrile o/n. Pt reports mild sob with exertion. Denies chest pain, palpitation, dizziness     ADDITIONAL REVIEW OF SYSTEMS:    RESPIRATORY: No cough, wheezing, chills or hemoptysis; + shortness of breath  CARDIOVASCULAR: No chest pain, palpitations, dizziness, or leg swelling  GASTROINTESTINAL: No abdominal or epigastric pain. No nausea, vomiting, or hematemesis; No diarrhea or constipation. No melena or hematochezia.    MEDICATIONS  (STANDING):  azithromycin   Tablet 500 milliGRAM(s) Oral daily  cefTRIAXone   IVPB 1000 milliGRAM(s) IV Intermittent every 24 hours  heparin  Infusion.  Unit(s)/Hr (23 mL/Hr) IV Continuous <Continuous>    MEDICATIONS  (PRN):  heparin  Injectable 81703 Unit(s) IV Push every 6 hours PRN For aPTT less than 40  heparin  Injectable 5000 Unit(s) IV Push every 6 hours PRN For aPTT between 40 - 57      CAPILLARY BLOOD GLUCOSE        I&O's Summary      PHYSICAL EXAM:  Vital Signs Last 24 Hrs  T(C): 37.2 (11 Apr 2020 09:52), Max: 37.2 (11 Apr 2020 09:52)  T(F): 99 (11 Apr 2020 09:52), Max: 99 (11 Apr 2020 09:52)  HR: 96 (11 Apr 2020 09:52) (96 - 96)  BP: 112/63 (11 Apr 2020 09:52) (112/63 - 112/63)  BP(mean): --  RR: 20 (11 Apr 2020 09:52) (20 - 20)  SpO2: 96% (11 Apr 2020 09:52) (96% - 96%)    CONSTITUTIONAL: NAD, well-developed, well-groomed  EYES: PERRLA; conjunctiva and sclera clear  ENMT: Moist oral mucosa, no pharyngeal injection or exudates;   NECK: Supple, no palpable masses;  RESPIRATORY: Normal respiratory effort; basilar crackles bilaterally  CARDIOVASCULAR: Regular rate and rhythm, normal S1 and S2, no murmur/rub/gallop; No lower extremity edema; Peripheral pulses are 2+ bilaterally  ABDOMEN: Nontender to palpation, normoactive bowel sounds, no rebound/guarding;   MUSCLOSKELETAL:  Normal gait; no clubbing or cyanosis of digits; no joint swelling or tenderness to palpation  PSYCH: A+O to person, place, and time; affect appropriate  NEUROLOGY: CN 2-12 are intact and symmetric; no gross sensory deficits;   SKIN: No rashes; no palpable lesions    LABS:                        12.7   14.29 )-----------( 198      ( 11 Apr 2020 06:30 )             38.0     04-10    134<L>  |  94<L>  |  22  ----------------------------<  150<H>  5.1   |  20<L>  |  1.08    Ca    9.0      10 Apr 2020 04:30  Phos  3.1     04-10  Mg     2.7     04-10    TPro  7.6  /  Alb  3.5  /  TBili  1.1  /  DBili  x   /  AST  50<H>  /  ALT  36  /  AlkPhos  78  04-10    PT/INR - ( 10 Apr 2020 04:30 )   PT: 19.1 SEC;   INR: 1.65          PTT - ( 11 Apr 2020 14:29 )  PTT:85.0 SEC            RADIOLOGY & ADDITIONAL TESTS:  Results Reviewed:   Imaging Personally Reviewed:  Electrocardiogram Personally Reviewed:    COORDINATION OF CARE:  Care Discussed with Consultants/Other Providers [Y/N]:  Prior or Outpatient Records Reviewed [Y/N]:

## 2020-04-11 NOTE — PROGRESS NOTE ADULT - PROBLEM SELECTOR PLAN 3
CXR with patchy ground-glass opacities and RLL consolidation.  -given COVID neg x1 will start abx for possible bacterial PNA  - patient on supplemental oxygen, monitor respiratory status closely  - continue with supportive care

## 2020-04-11 NOTE — PROGRESS NOTE ADULT - ASSESSMENT
49M PMH psoriasis on monthly immunosuppressant injections of who presents with shortness of breath, concern for PNA. CTA chest also with saddle pulmonary emboli, admitted for further management.

## 2020-04-11 NOTE — PROGRESS NOTE ADULT - PROBLEM SELECTOR PLAN 2
CTA chest with extensive bilateral pulmonary emboli with saddle thrombus identified. currently HD stable. mild hypoxia   - as per MICU, bedside TTE showed mild RV dilation however no septal wall flattening, no TR, no RV strain; not a MICU candidate at this time  - given significant clot burdern will cont heparin gtt for AC minimal 3 days  - TTE pending  - will consider switching to NOAC if no R heart strain on echo. pt uninsured. will need SW assistance for meds  - monitor on telemetry

## 2020-04-12 LAB
ANION GAP SERPL CALC-SCNC: 14 MMO/L — SIGNIFICANT CHANGE UP (ref 7–14)
APTT BLD: 107.4 SEC — HIGH (ref 27.5–36.3)
APTT BLD: 52.4 SEC — HIGH (ref 27.5–36.3)
APTT BLD: 70.3 SEC — HIGH (ref 27.5–36.3)
BASOPHILS # BLD AUTO: 0.07 K/UL — SIGNIFICANT CHANGE UP (ref 0–0.2)
BASOPHILS NFR BLD AUTO: 0.7 % — SIGNIFICANT CHANGE UP (ref 0–2)
BUN SERPL-MCNC: 13 MG/DL — SIGNIFICANT CHANGE UP (ref 7–23)
CALCIUM SERPL-MCNC: 8.6 MG/DL — SIGNIFICANT CHANGE UP (ref 8.4–10.5)
CHLORIDE SERPL-SCNC: 93 MMOL/L — LOW (ref 98–107)
CO2 SERPL-SCNC: 25 MMOL/L — SIGNIFICANT CHANGE UP (ref 22–31)
CREAT SERPL-MCNC: 0.81 MG/DL — SIGNIFICANT CHANGE UP (ref 0.5–1.3)
EOSINOPHIL # BLD AUTO: 0.29 K/UL — SIGNIFICANT CHANGE UP (ref 0–0.5)
EOSINOPHIL NFR BLD AUTO: 2.7 % — SIGNIFICANT CHANGE UP (ref 0–6)
GLUCOSE SERPL-MCNC: 102 MG/DL — HIGH (ref 70–99)
HCT VFR BLD CALC: 39.3 % — SIGNIFICANT CHANGE UP (ref 39–50)
HCT VFR BLD CALC: 39.5 % — SIGNIFICANT CHANGE UP (ref 39–50)
HGB BLD-MCNC: 12.7 G/DL — LOW (ref 13–17)
HGB BLD-MCNC: 12.7 G/DL — LOW (ref 13–17)
IMM GRANULOCYTES NFR BLD AUTO: 1.8 % — HIGH (ref 0–1.5)
LYMPHOCYTES # BLD AUTO: 2.65 K/UL — SIGNIFICANT CHANGE UP (ref 1–3.3)
LYMPHOCYTES # BLD AUTO: 25 % — SIGNIFICANT CHANGE UP (ref 13–44)
MAGNESIUM SERPL-MCNC: 2.2 MG/DL — SIGNIFICANT CHANGE UP (ref 1.6–2.6)
MCHC RBC-ENTMCNC: 28.7 PG — SIGNIFICANT CHANGE UP (ref 27–34)
MCHC RBC-ENTMCNC: 28.9 PG — SIGNIFICANT CHANGE UP (ref 27–34)
MCHC RBC-ENTMCNC: 32.2 % — SIGNIFICANT CHANGE UP (ref 32–36)
MCHC RBC-ENTMCNC: 32.3 % — SIGNIFICANT CHANGE UP (ref 32–36)
MCV RBC AUTO: 89.3 FL — SIGNIFICANT CHANGE UP (ref 80–100)
MCV RBC AUTO: 89.4 FL — SIGNIFICANT CHANGE UP (ref 80–100)
MONOCYTES # BLD AUTO: 0.57 K/UL — SIGNIFICANT CHANGE UP (ref 0–0.9)
MONOCYTES NFR BLD AUTO: 5.4 % — SIGNIFICANT CHANGE UP (ref 2–14)
NEUTROPHILS # BLD AUTO: 6.81 K/UL — SIGNIFICANT CHANGE UP (ref 1.8–7.4)
NEUTROPHILS NFR BLD AUTO: 64.4 % — SIGNIFICANT CHANGE UP (ref 43–77)
NRBC # FLD: 0 K/UL — SIGNIFICANT CHANGE UP (ref 0–0)
NRBC # FLD: 0 K/UL — SIGNIFICANT CHANGE UP (ref 0–0)
PLATELET # BLD AUTO: 208 K/UL — SIGNIFICANT CHANGE UP (ref 150–400)
PLATELET # BLD AUTO: 224 K/UL — SIGNIFICANT CHANGE UP (ref 150–400)
PMV BLD: 12.5 FL — SIGNIFICANT CHANGE UP (ref 7–13)
PMV BLD: 12.7 FL — SIGNIFICANT CHANGE UP (ref 7–13)
POTASSIUM SERPL-MCNC: 3.6 MMOL/L — SIGNIFICANT CHANGE UP (ref 3.5–5.3)
POTASSIUM SERPL-SCNC: 3.6 MMOL/L — SIGNIFICANT CHANGE UP (ref 3.5–5.3)
RBC # BLD: 4.4 M/UL — SIGNIFICANT CHANGE UP (ref 4.2–5.8)
RBC # BLD: 4.42 M/UL — SIGNIFICANT CHANGE UP (ref 4.2–5.8)
RBC # FLD: 13.4 % — SIGNIFICANT CHANGE UP (ref 10.3–14.5)
RBC # FLD: 13.6 % — SIGNIFICANT CHANGE UP (ref 10.3–14.5)
SODIUM SERPL-SCNC: 132 MMOL/L — LOW (ref 135–145)
WBC # BLD: 10.22 K/UL — SIGNIFICANT CHANGE UP (ref 3.8–10.5)
WBC # BLD: 10.58 K/UL — HIGH (ref 3.8–10.5)
WBC # FLD AUTO: 10.22 K/UL — SIGNIFICANT CHANGE UP (ref 3.8–10.5)
WBC # FLD AUTO: 10.58 K/UL — HIGH (ref 3.8–10.5)

## 2020-04-12 PROCEDURE — 99233 SBSQ HOSP IP/OBS HIGH 50: CPT

## 2020-04-12 RX ADMIN — HEPARIN SODIUM 1300 UNIT(S)/HR: 5000 INJECTION INTRAVENOUS; SUBCUTANEOUS at 15:54

## 2020-04-12 RX ADMIN — HEPARIN SODIUM 1600 UNIT(S)/HR: 5000 INJECTION INTRAVENOUS; SUBCUTANEOUS at 23:19

## 2020-04-12 RX ADMIN — HEPARIN SODIUM 1300 UNIT(S)/HR: 5000 INJECTION INTRAVENOUS; SUBCUTANEOUS at 09:30

## 2020-04-12 RX ADMIN — CEFTRIAXONE 100 MILLIGRAM(S): 500 INJECTION, POWDER, FOR SOLUTION INTRAMUSCULAR; INTRAVENOUS at 15:25

## 2020-04-12 RX ADMIN — HEPARIN SODIUM 5000 UNIT(S): 5000 INJECTION INTRAVENOUS; SUBCUTANEOUS at 23:22

## 2020-04-12 RX ADMIN — AZITHROMYCIN 500 MILLIGRAM(S): 500 TABLET, FILM COATED ORAL at 15:25

## 2020-04-12 NOTE — PROGRESS NOTE ADULT - PROBLEM SELECTOR PLAN 2
CTA chest with extensive bilateral pulmonary emboli with saddle thrombus identified. currently HD stable. mild hypoxia   - as per MICU, bedside TTE showed mild RV dilation however no septal wall flattening, no TR, no RV strain; not a MICU candidate at this time  - given significant clot burden will cont heparin gtt for AC minimal 3 days  - TTE pending  - will consider switching to NOAC if no R heart strain on echo.   - monitor on telemetry CTA chest with extensive bilateral pulmonary emboli with saddle thrombus identified. currently HD stable. mild hypoxia   - as per MICU, bedside TTE showed mild RV dilation however no septal wall flattening, no TR, no RV strain; not a MICU candidate at this time  - given significant clot burden will cont heparin gtt for AC minimal 3 days  - TTE pending  - will consider switching to NOAC if no R heart strain on echo.   -discussed with pt that he will need to f/u PCP for hypercoagulability w/u and age appropriate cancer screening.   - monitor on telemetry

## 2020-04-12 NOTE — PROGRESS NOTE ADULT - SUBJECTIVE AND OBJECTIVE BOX
[facility] Division of Hospital Medicine  Joanne Anderson MD  Pager [phone]      Patient is a 49y old  Male who presents with a chief complaint of Shortness of breath (11 Apr 2020 15:15)      SUBJECTIVE / OVERNIGHT EVENTS:    No acute event o/n. Pt off O2 satting well. Denies sob, palpitation, chest pain     ADDITIONAL REVIEW OF SYSTEMS:    RESPIRATORY: No cough, wheezing, chills or hemoptysis; No shortness of breath  CARDIOVASCULAR: No chest pain, palpitations, dizziness, or leg swelling  GASTROINTESTINAL: No abdominal or epigastric pain. No nausea, vomiting, or hematemesis; No diarrhea or constipation. No melena or hematochezia.      MEDICATIONS  (STANDING):  azithromycin   Tablet 500 milliGRAM(s) Oral daily  cefTRIAXone   IVPB 1000 milliGRAM(s) IV Intermittent every 24 hours  heparin  Infusion.  Unit(s)/Hr (23 mL/Hr) IV Continuous <Continuous>    MEDICATIONS  (PRN):  heparin  Injectable 01811 Unit(s) IV Push every 6 hours PRN For aPTT less than 40  heparin  Injectable 5000 Unit(s) IV Push every 6 hours PRN For aPTT between 40 - 57      CAPILLARY BLOOD GLUCOSE        I&O's Summary      PHYSICAL EXAM:  Vital Signs Last 24 Hrs  T(C): 36.4 (12 Apr 2020 06:17), Max: 36.4 (12 Apr 2020 06:17)  T(F): 97.5 (12 Apr 2020 06:17), Max: 97.5 (12 Apr 2020 06:17)  HR: 94 (12 Apr 2020 06:17) (94 - 94)  BP: 101/63 (12 Apr 2020 06:17) (101/63 - 101/63)  BP(mean): --  RR: 20 (12 Apr 2020 06:17) (20 - 20)  SpO2: 94% (12 Apr 2020 06:17) (94% - 94%)    CONSTITUTIONAL: NAD, well-developed, well-groomed  EYES: PERRLA; conjunctiva and sclera clear  ENMT: Moist oral mucosa, no pharyngeal injection or exudates;   NECK: Supple, no palpable masses;  RESPIRATORY: Normal respiratory effort; basilar crackles bilaterally  CARDIOVASCULAR: Regular rate and rhythm, normal S1 and S2, no murmur/rub/gallop; No lower extremity edema; Peripheral pulses are 2+ bilaterally  ABDOMEN: Nontender to palpation, normoactive bowel sounds, no rebound/guarding;   MUSCLOSKELETAL:  Normal gait; no clubbing or cyanosis of digits; no joint swelling or tenderness to palpation  PSYCH: A+O to person, place, and time; affect appropriate  NEUROLOGY: CN 2-12 are intact and symmetric; no gross sensory deficits;   SKIN: No rashes; no palpable lesions    LABS:                        12.7   10.58 )-----------( 208      ( 12 Apr 2020 06:26 )             39.5     04-12    132<L>  |  93<L>  |  13  ----------------------------<  102<H>  3.6   |  25  |  0.81    Ca    8.6      12 Apr 2020 06:26  Mg     2.2     04-12      PTT - ( 12 Apr 2020 06:26 )  PTT:107.4 SEC            RADIOLOGY & ADDITIONAL TESTS:  Results Reviewed:   Imaging Personally Reviewed:  Electrocardiogram Personally Reviewed:    COORDINATION OF CARE:  Care Discussed with Consultants/Other Providers [Y/N]:  Prior or Outpatient Records Reviewed [Y/N]:

## 2020-04-12 NOTE — PROGRESS NOTE ADULT - PROBLEM SELECTOR PLAN 3
CXR with patchy ground-glass opacities and RLL consolidation.  -given COVID neg x 2 will start abx for possible bacterial PNA  - patient on supplemental oxygen, monitor respiratory status closely  - continue with supportive care

## 2020-04-12 NOTE — PROGRESS NOTE ADULT - PROBLEM SELECTOR PLAN 1
Patient presents with complaint of fever, shortness of breath, and diarrhea. WBC 15.21, D-dimer 4176, ferritin 1999, .3.  - COVID-19 PCR is negative x2. , discussed with ID- rec continue isolation as imaging is typical for COVID. Neg test likely d/t low viral load in nasal pharynx   - continue with supportive care  - trend CBC daily and monitor vitals

## 2020-04-13 LAB
ANION GAP SERPL CALC-SCNC: 14 MMO/L — SIGNIFICANT CHANGE UP (ref 7–14)
APTT BLD: 130.4 SEC — CRITICAL HIGH (ref 27.5–36.3)
APTT BLD: 56.6 SEC — HIGH (ref 27.5–36.3)
APTT BLD: 89.7 SEC — HIGH (ref 27.5–36.3)
BASOPHILS # BLD AUTO: 0.07 K/UL — SIGNIFICANT CHANGE UP (ref 0–0.2)
BASOPHILS NFR BLD AUTO: 0.7 % — SIGNIFICANT CHANGE UP (ref 0–2)
BASOPHILS NFR SPEC: 0 % — SIGNIFICANT CHANGE UP (ref 0–2)
BLASTS # FLD: 0 % — SIGNIFICANT CHANGE UP (ref 0–0)
BUN SERPL-MCNC: 9 MG/DL — SIGNIFICANT CHANGE UP (ref 7–23)
CALCIUM SERPL-MCNC: 9 MG/DL — SIGNIFICANT CHANGE UP (ref 8.4–10.5)
CHLORIDE SERPL-SCNC: 97 MMOL/L — LOW (ref 98–107)
CK MB BLD-MCNC: < 1 NG/ML — LOW (ref 1–6.6)
CK MB BLD-MCNC: SIGNIFICANT CHANGE UP (ref 0–2.5)
CK SERPL-CCNC: 37 U/L — SIGNIFICANT CHANGE UP (ref 30–200)
CO2 SERPL-SCNC: 25 MMOL/L — SIGNIFICANT CHANGE UP (ref 22–31)
CREAT SERPL-MCNC: 0.81 MG/DL — SIGNIFICANT CHANGE UP (ref 0.5–1.3)
EOSINOPHIL # BLD AUTO: 0.39 K/UL — SIGNIFICANT CHANGE UP (ref 0–0.5)
EOSINOPHIL NFR BLD AUTO: 3.6 % — SIGNIFICANT CHANGE UP (ref 0–6)
EOSINOPHIL NFR FLD: 2.7 % — SIGNIFICANT CHANGE UP (ref 0–6)
GIANT PLATELETS BLD QL SMEAR: PRESENT — SIGNIFICANT CHANGE UP
GLUCOSE SERPL-MCNC: 115 MG/DL — HIGH (ref 70–99)
HCT VFR BLD CALC: 42.4 % — SIGNIFICANT CHANGE UP (ref 39–50)
HGB BLD-MCNC: 13.6 G/DL — SIGNIFICANT CHANGE UP (ref 13–17)
IMM GRANULOCYTES NFR BLD AUTO: 2.5 % — HIGH (ref 0–1.5)
INR BLD: 1.29 — HIGH (ref 0.88–1.17)
LYMPHOCYTES # BLD AUTO: 2.66 K/UL — SIGNIFICANT CHANGE UP (ref 1–3.3)
LYMPHOCYTES # BLD AUTO: 24.8 % — SIGNIFICANT CHANGE UP (ref 13–44)
LYMPHOCYTES NFR SPEC AUTO: 15.3 % — SIGNIFICANT CHANGE UP (ref 13–44)
MAGNESIUM SERPL-MCNC: 2.3 MG/DL — SIGNIFICANT CHANGE UP (ref 1.6–2.6)
MCHC RBC-ENTMCNC: 28.9 PG — SIGNIFICANT CHANGE UP (ref 27–34)
MCHC RBC-ENTMCNC: 32.1 % — SIGNIFICANT CHANGE UP (ref 32–36)
MCV RBC AUTO: 90 FL — SIGNIFICANT CHANGE UP (ref 80–100)
METAMYELOCYTES # FLD: 0 % — SIGNIFICANT CHANGE UP (ref 0–1)
MONOCYTES # BLD AUTO: 0.58 K/UL — SIGNIFICANT CHANGE UP (ref 0–0.9)
MONOCYTES NFR BLD AUTO: 5.4 % — SIGNIFICANT CHANGE UP (ref 2–14)
MONOCYTES NFR BLD: 4.5 % — SIGNIFICANT CHANGE UP (ref 2–9)
MYELOCYTES NFR BLD: 0 % — SIGNIFICANT CHANGE UP (ref 0–0)
NEUTROPHIL AB SER-ACNC: 75.7 % — SIGNIFICANT CHANGE UP (ref 43–77)
NEUTROPHILS # BLD AUTO: 6.74 K/UL — SIGNIFICANT CHANGE UP (ref 1.8–7.4)
NEUTROPHILS NFR BLD AUTO: 63 % — SIGNIFICANT CHANGE UP (ref 43–77)
NEUTS BAND # BLD: 0 % — SIGNIFICANT CHANGE UP (ref 0–6)
NRBC # FLD: 0 K/UL — SIGNIFICANT CHANGE UP (ref 0–0)
OTHER - HEMATOLOGY %: 0 — SIGNIFICANT CHANGE UP
PLATELET # BLD AUTO: 243 K/UL — SIGNIFICANT CHANGE UP (ref 150–400)
PLATELET COUNT - ESTIMATE: NORMAL — SIGNIFICANT CHANGE UP
PMV BLD: 11.8 FL — SIGNIFICANT CHANGE UP (ref 7–13)
POLYCHROMASIA BLD QL SMEAR: SLIGHT — SIGNIFICANT CHANGE UP
POTASSIUM SERPL-MCNC: 3.7 MMOL/L — SIGNIFICANT CHANGE UP (ref 3.5–5.3)
POTASSIUM SERPL-SCNC: 3.7 MMOL/L — SIGNIFICANT CHANGE UP (ref 3.5–5.3)
PROMYELOCYTES # FLD: 0 % — SIGNIFICANT CHANGE UP (ref 0–0)
PROTHROM AB SERPL-ACNC: 15 SEC — HIGH (ref 9.8–13.1)
RBC # BLD: 4.71 M/UL — SIGNIFICANT CHANGE UP (ref 4.2–5.8)
RBC # FLD: 14 % — SIGNIFICANT CHANGE UP (ref 10.3–14.5)
SMUDGE CELLS # BLD: PRESENT — SIGNIFICANT CHANGE UP
SODIUM SERPL-SCNC: 136 MMOL/L — SIGNIFICANT CHANGE UP (ref 135–145)
TROPONIN T, HIGH SENSITIVITY: 7 NG/L — SIGNIFICANT CHANGE UP (ref ?–14)
VARIANT LYMPHS # BLD: 1.8 % — SIGNIFICANT CHANGE UP
WBC # BLD: 10.71 K/UL — HIGH (ref 3.8–10.5)
WBC # FLD AUTO: 10.71 K/UL — HIGH (ref 3.8–10.5)

## 2020-04-13 PROCEDURE — 93010 ELECTROCARDIOGRAM REPORT: CPT

## 2020-04-13 PROCEDURE — 99232 SBSQ HOSP IP/OBS MODERATE 35: CPT

## 2020-04-13 PROCEDURE — 93306 TTE W/DOPPLER COMPLETE: CPT | Mod: 26

## 2020-04-13 RX ADMIN — HEPARIN SODIUM 1500 UNIT(S)/HR: 5000 INJECTION INTRAVENOUS; SUBCUTANEOUS at 23:08

## 2020-04-13 RX ADMIN — HEPARIN SODIUM 0 UNIT(S)/HR: 5000 INJECTION INTRAVENOUS; SUBCUTANEOUS at 22:01

## 2020-04-13 RX ADMIN — HEPARIN SODIUM 1600 UNIT(S)/HR: 5000 INJECTION INTRAVENOUS; SUBCUTANEOUS at 06:57

## 2020-04-13 RX ADMIN — HEPARIN SODIUM 1900 UNIT(S)/HR: 5000 INJECTION INTRAVENOUS; SUBCUTANEOUS at 14:10

## 2020-04-13 RX ADMIN — HEPARIN SODIUM 5000 UNIT(S): 5000 INJECTION INTRAVENOUS; SUBCUTANEOUS at 14:10

## 2020-04-13 RX ADMIN — CEFTRIAXONE 100 MILLIGRAM(S): 500 INJECTION, POWDER, FOR SOLUTION INTRAMUSCULAR; INTRAVENOUS at 15:11

## 2020-04-13 RX ADMIN — AZITHROMYCIN 500 MILLIGRAM(S): 500 TABLET, FILM COATED ORAL at 11:21

## 2020-04-13 NOTE — PROGRESS NOTE ADULT - PROBLEM SELECTOR PLAN 2
CTA chest with extensive bilateral pulmonary emboli with saddle thrombus identified. currently HD stable. mild hypoxia   - TTE: Did not report RV strain   - given significant clot burden will cont heparin gtt for AC minimal 3 days  - c/o pleuritic chest pain, will consider switching to NOAC tomorrow if patient is less symptomatic, EKG - NO acute abnormalities, Trop/CKMB ordered  - No R heart strain on echo.   - discussed with pt that he will need to f/u PCP for hypercoagulability w/u and age appropriate cancer screening.   - cont to monitor on telemetry

## 2020-04-13 NOTE — PROVIDER CONTACT NOTE (CRITICAL VALUE NOTIFICATION) - SITUATION
pt 427a critical result 130.4 ptt  will stop drip for 1h and reduce rate according to heparin nomagram  no action needed by provider  charles jauregui [id number]

## 2020-04-13 NOTE — PROGRESS NOTE ADULT - SUBJECTIVE AND OBJECTIVE BOX
[facility] Division of Hospital Medicine  Joanne Anderson MD  Pager [phone]      Patient is a 49y old  Male who presents with a chief complaint of Shortness of breath (11 Apr 2020 15:15)      SUBJECTIVE / OVERNIGHT EVENTS: Pt c/o sharp pleuritic chest pain off and off, lasting few seconds, worse with inspiration. not associated with SOB, palpitations or dizziness.  No acute event overnight. Pt off O2 sating well. Denies sob, palpitation, chest pain     ADDITIONAL REVIEW OF SYSTEMS:    RESPIRATORY: No cough, wheezing, chills or hemoptysis; No shortness of breath  CARDIOVASCULAR: No chest pain, palpitations, dizziness, or leg swelling  GASTROINTESTINAL: No abdominal or epigastric pain. No nausea, vomiting, or hematemesis; No diarrhea or constipation. No melena or hematochezia.      MEDICATIONS  (STANDING):  azithromycin   Tablet 500 milliGRAM(s) Oral daily  cefTRIAXone   IVPB 1000 milliGRAM(s) IV Intermittent every 24 hours  heparin  Infusion.  Unit(s)/Hr (23 mL/Hr) IV Continuous <Continuous>    MEDICATIONS  (PRN):  heparin  Injectable 81507 Unit(s) IV Push every 6 hours PRN For aPTT less than 40  heparin  Injectable 5000 Unit(s) IV Push every 6 hours PRN For aPTT between 40 - 57      I&O's Summary      PHYSICAL EXAM:  T(C): 37.2 (04-13-20 @ 11:21), Max: 37.2 (04-13-20 @ 11:21)  T(F): 99 (04-13-20 @ 11:21), Max: 99 (04-13-20 @ 11:21)  HR: 99 (04-13-20 @ 11:21) (99 - 101)  BP: 109/69 (04-13-20 @ 11:21) (109/69 - 124/77)  RR: 20 (04-13-20 @ 11:21) (20 - 20)  SpO2: 91% (04-13-20 @ 11:21) (91% - 96%)  Wt(kg): --    CONSTITUTIONAL: NAD, well-developed, well-groomed  EYES: PERRLA; conjunctiva and sclera clear  ENMT: Moist oral mucosa, no pharyngeal injection or exudates;   NECK: Supple, no palpable masses;  RESPIRATORY: Normal respiratory effort; basilar crackles bilaterally  CARDIOVASCULAR: Regular rate and rhythm, normal S1 and S2, no murmur/rub/gallop; No lower extremity edema; Peripheral pulses are 2+ bilaterally  ABDOMEN: Nontender to palpation, normoactive bowel sounds, no rebound/guarding;   MUSCLOSKELETAL:  Normal gait; no clubbing or cyanosis of digits; no joint swelling or tenderness to palpation  PSYCH: A+O to person, place, and time; affect appropriate  NEUROLOGY: CN 2-12 are intact and symmetric; no gross sensory deficits;   SKIN: No rashes; no palpable lesions    LABS:                        13.6   10.71 )-----------( 243      ( 13 Apr 2020 05:25 )             42.4   04-13    136  |  97<L>  |  9   ----------------------------<  115<H>  3.7   |  25  |  0.81    Ca    9.0      13 Apr 2020 05:25  Mg     2.3     04-13    PT/INR - ( 13 Apr 2020 05:25 )   PT: 15.0 SEC;   INR: 1.29          PTT - ( 13 Apr 2020 12:28 )  PTT:56.6 SEC                RADIOLOGY & ADDITIONAL TESTS:  < from: Transthoracic Echocardiogram (04.13.20 @ 12:20) >  CONCLUSIONS:  1. Normal left ventricular internal dimensions and wall  thicknesses.  2. Endocardium not well visualized; grossly normal left  ventricular systolic function. Unable to evaluate for small  segmental wall motion abnormality.  3. The right ventricle is not well visualized; grossly  normal right ventricular systolic function.  ------------------------------------------------------------------------  Confirmed on  4/13/2020 - 14:35:56 by ALEXANDRE Jewell    < end of copied text >    Results Reviewed:   Imaging Personally Reviewed:  Electrocardiogram Personally Reviewed:    COORDINATION OF CARE:  Care Discussed with Consultants/Other Providers [Y/N]:  Prior or Outpatient Records Reviewed [Y/N]:

## 2020-04-13 NOTE — PROGRESS NOTE ADULT - PROBLEM SELECTOR PLAN 3
CXR with patchy ground-glass opacities and RLL consolidation.  - given COVID neg x 2 will start abx for possible bacterial PNA  - patient off supplemental oxygen, monitor respiratory status closely  - continue with supportive care

## 2020-04-14 LAB
ANION GAP SERPL CALC-SCNC: 10 MMO/L — SIGNIFICANT CHANGE UP (ref 7–14)
APTT BLD: 73.8 SEC — HIGH (ref 27.5–36.3)
APTT BLD: 73.8 SEC — HIGH (ref 27.5–36.3)
BUN SERPL-MCNC: 10 MG/DL — SIGNIFICANT CHANGE UP (ref 7–23)
CALCIUM SERPL-MCNC: 8.9 MG/DL — SIGNIFICANT CHANGE UP (ref 8.4–10.5)
CHLORIDE SERPL-SCNC: 96 MMOL/L — LOW (ref 98–107)
CO2 SERPL-SCNC: 28 MMOL/L — SIGNIFICANT CHANGE UP (ref 22–31)
CREAT SERPL-MCNC: 0.83 MG/DL — SIGNIFICANT CHANGE UP (ref 0.5–1.3)
GLUCOSE SERPL-MCNC: 108 MG/DL — HIGH (ref 70–99)
HCT VFR BLD CALC: 39.9 % — SIGNIFICANT CHANGE UP (ref 39–50)
HGB BLD-MCNC: 12.8 G/DL — LOW (ref 13–17)
MAGNESIUM SERPL-MCNC: 2.3 MG/DL — SIGNIFICANT CHANGE UP (ref 1.6–2.6)
MCHC RBC-ENTMCNC: 29 PG — SIGNIFICANT CHANGE UP (ref 27–34)
MCHC RBC-ENTMCNC: 32.1 % — SIGNIFICANT CHANGE UP (ref 32–36)
MCV RBC AUTO: 90.5 FL — SIGNIFICANT CHANGE UP (ref 80–100)
NRBC # FLD: 0 K/UL — SIGNIFICANT CHANGE UP (ref 0–0)
PLATELET # BLD AUTO: 240 K/UL — SIGNIFICANT CHANGE UP (ref 150–400)
PMV BLD: 11.5 FL — SIGNIFICANT CHANGE UP (ref 7–13)
POTASSIUM SERPL-MCNC: 4.1 MMOL/L — SIGNIFICANT CHANGE UP (ref 3.5–5.3)
POTASSIUM SERPL-SCNC: 4.1 MMOL/L — SIGNIFICANT CHANGE UP (ref 3.5–5.3)
RBC # BLD: 4.41 M/UL — SIGNIFICANT CHANGE UP (ref 4.2–5.8)
RBC # FLD: 14 % — SIGNIFICANT CHANGE UP (ref 10.3–14.5)
SODIUM SERPL-SCNC: 134 MMOL/L — LOW (ref 135–145)
TROPONIN T, HIGH SENSITIVITY: 7 NG/L — SIGNIFICANT CHANGE UP (ref ?–14)
WBC # BLD: 9.93 K/UL — SIGNIFICANT CHANGE UP (ref 3.8–10.5)
WBC # FLD AUTO: 9.93 K/UL — SIGNIFICANT CHANGE UP (ref 3.8–10.5)

## 2020-04-14 PROCEDURE — 99232 SBSQ HOSP IP/OBS MODERATE 35: CPT

## 2020-04-14 RX ORDER — RIVAROXABAN 15 MG-20MG
1 KIT ORAL
Qty: 30 | Refills: 0
Start: 2020-04-14 | End: 2020-05-13

## 2020-04-14 RX ORDER — APIXABAN 2.5 MG/1
10 TABLET, FILM COATED ORAL EVERY 12 HOURS
Refills: 0 | Status: DISCONTINUED | OUTPATIENT
Start: 2020-04-14 | End: 2020-04-17

## 2020-04-14 RX ORDER — APIXABAN 2.5 MG/1
1 TABLET, FILM COATED ORAL
Qty: 60 | Refills: 0
Start: 2020-04-14 | End: 2020-05-13

## 2020-04-14 RX ADMIN — AZITHROMYCIN 500 MILLIGRAM(S): 500 TABLET, FILM COATED ORAL at 11:30

## 2020-04-14 RX ADMIN — APIXABAN 10 MILLIGRAM(S): 2.5 TABLET, FILM COATED ORAL at 17:35

## 2020-04-14 RX ADMIN — CEFTRIAXONE 100 MILLIGRAM(S): 500 INJECTION, POWDER, FOR SOLUTION INTRAMUSCULAR; INTRAVENOUS at 15:24

## 2020-04-14 RX ADMIN — HEPARIN SODIUM 1500 UNIT(S)/HR: 5000 INJECTION INTRAVENOUS; SUBCUTANEOUS at 06:17

## 2020-04-14 RX ADMIN — HEPARIN SODIUM 1500 UNIT(S)/HR: 5000 INJECTION INTRAVENOUS; SUBCUTANEOUS at 15:14

## 2020-04-14 NOTE — PROGRESS NOTE ADULT - SUBJECTIVE AND OBJECTIVE BOX
[facility] Division of Hospital Medicine  Joanne Anderson MD  Pager [phone]      Patient is a 49y old  Male who presents with a chief complaint of Shortness of breath (11 Apr 2020 15:15)      SUBJECTIVE / OVERNIGHT EVENTS: Pt is not complaining of chest pain anymore and feels better. He however does not drink too fluids and appears mildly dehydrated. He gets tachycardic to 130s on standing up but O2 sat does not drop and he does not complain of SOB. He also denied any nausea, vomiting, belly pain, diarrhea.     ADDITIONAL REVIEW OF SYSTEMS:    RESPIRATORY: No cough, wheezing, chills or hemoptysis; No shortness of breath  CARDIOVASCULAR: No chest pain, palpitations, dizziness, or leg swelling  GASTROINTESTINAL: No abdominal or epigastric pain. No nausea, vomiting, or hematemesis; No diarrhea or constipation. No melena or hematochezia.    MEDICATIONS  (STANDING):  azithromycin   Tablet 500 milliGRAM(s) Oral daily  cefTRIAXone   IVPB 1000 milliGRAM(s) IV Intermittent every 24 hours    MEDICATIONS  (PRN):        I&O's Summary      PHYSICAL EXAM:  T(C): 37 (04-14-20 @ 09:51), Max: 37.1 (04-14-20 @ 06:42)  T(F): 98.6 (04-14-20 @ 09:51), Max: 98.8 (04-14-20 @ 06:42)  HR: 98 (04-14-20 @ 09:51) (98 - 104)  BP: 119/76 (04-14-20 @ 09:51) (115/77 - 119/76)  RR: 20 (04-14-20 @ 15:30) (20 - 20)  SpO2: 90% (04-14-20 @ 15:30) (90% - 97%)  Wt(kg): --    CONSTITUTIONAL: NAD, well-developed, well-groomed  EYES: PERRLA; conjunctiva and sclera clear  ENMT: Moist oral mucosa, no pharyngeal injection or exudates;   NECK: Supple, no palpable masses;  RESPIRATORY: Normal respiratory effort; basilar crackles bilaterally  CARDIOVASCULAR: Regular rate and rhythm, normal S1 and S2, no murmur/rub/gallop; No lower extremity edema; Peripheral pulses are 2+ bilaterally  ABDOMEN: Nontender to palpation, normoactive bowel sounds, no rebound/guarding;   MUSCLOSKELETAL:  Normal gait; no clubbing or cyanosis of digits; no joint swelling or tenderness to palpation  PSYCH: A+O to person, place, and time; affect appropriate  NEUROLOGY: CN 2-12 are intact and symmetric; no gross sensory deficits;   SKIN: No rashes; no palpable lesions    LABS:                                   12.8   9.93  )-----------( 240      ( 14 Apr 2020 05:02 )             39.9   04-14    134<L>  |  96<L>  |  10  ----------------------------<  108<H>  4.1   |  28  |  0.83    Ca    8.9      14 Apr 2020 05:02  Mg     2.3     04-14    PT/INR - ( 13 Apr 2020 05:25 )   PT: 15.0 SEC;   INR: 1.29          PTT - ( 14 Apr 2020 11:47 )  PTT:73.8 SEC                RADIOLOGY & ADDITIONAL TESTS:  < from: Transthoracic Echocardiogram (04.13.20 @ 12:20) >  CONCLUSIONS:  1. Normal left ventricular internal dimensions and wall  thicknesses.  2. Endocardium not well visualized; grossly normal left  ventricular systolic function. Unable to evaluate for small  segmental wall motion abnormality.  3. The right ventricle is not well visualized; grossly  normal right ventricular systolic function.  ------------------------------------------------------------------------  Confirmed on  4/13/2020 - 14:35:56 by ALEXANDRE Jewell    < end of copied text >    Results Reviewed:   Imaging Personally Reviewed:  Electrocardiogram Personally Reviewed:    COORDINATION OF CARE:  Care Discussed with Consultants/Other Providers [Y/N]:  Prior or Outpatient Records Reviewed [Y/N]:

## 2020-04-14 NOTE — PROGRESS NOTE ADULT - PROBLEM SELECTOR PLAN 1
Patient presents with complaint of fever, shortness of breath, and diarrhea. WBC 15.21, D-dimer 4176, ferritin 1999, .3.  - COVID-19 PCR is negative x2. , discussed with ID- rec continue isolation as imaging is typical for COVID. Neg test likely d/t low viral load in nasal pharynx   - continue with supportive care  - trend CBC daily and monitor vitals  - will D/c the patient home tomorrow

## 2020-04-14 NOTE — PROGRESS NOTE ADULT - PROBLEM SELECTOR PLAN 2
CTA chest with extensive bilateral pulmonary emboli with saddle thrombus identified. currently HD stable. mild hypoxia   - TTE: Did not report RV strain   - given significant clot burden will cont heparin gtt for AC minimal 3 days  - No R heart strain on echo.   - EKG - No acute abn and Trop normal  - discussed with pt that he will need to f/u PCP for hypercoagulability w/u and age appropriate cancer screening.   - cont to monitor on telemetry  - Heparin drip D/dali and started on Eliquis 10 mg BID.

## 2020-04-15 ENCOUNTER — TRANSCRIPTION ENCOUNTER (OUTPATIENT)
Age: 50
End: 2020-04-15

## 2020-04-15 LAB
ANION GAP SERPL CALC-SCNC: 9 MMO/L — SIGNIFICANT CHANGE UP (ref 7–14)
APTT BLD: 41.4 SEC — HIGH (ref 27.5–36.3)
BUN SERPL-MCNC: 10 MG/DL — SIGNIFICANT CHANGE UP (ref 7–23)
CALCIUM SERPL-MCNC: 9.1 MG/DL — SIGNIFICANT CHANGE UP (ref 8.4–10.5)
CHLORIDE SERPL-SCNC: 94 MMOL/L — LOW (ref 98–107)
CO2 SERPL-SCNC: 29 MMOL/L — SIGNIFICANT CHANGE UP (ref 22–31)
CREAT SERPL-MCNC: 0.94 MG/DL — SIGNIFICANT CHANGE UP (ref 0.5–1.3)
GLUCOSE SERPL-MCNC: 102 MG/DL — HIGH (ref 70–99)
HCT VFR BLD CALC: 40.7 % — SIGNIFICANT CHANGE UP (ref 39–50)
HGB BLD-MCNC: 12.9 G/DL — LOW (ref 13–17)
MAGNESIUM SERPL-MCNC: 2.2 MG/DL — SIGNIFICANT CHANGE UP (ref 1.6–2.6)
MCHC RBC-ENTMCNC: 28.9 PG — SIGNIFICANT CHANGE UP (ref 27–34)
MCHC RBC-ENTMCNC: 31.7 % — LOW (ref 32–36)
MCV RBC AUTO: 91.1 FL — SIGNIFICANT CHANGE UP (ref 80–100)
NRBC # FLD: 0 K/UL — SIGNIFICANT CHANGE UP (ref 0–0)
PHOSPHATE SERPL-MCNC: 3.6 MG/DL — SIGNIFICANT CHANGE UP (ref 2.5–4.5)
PLATELET # BLD AUTO: 253 K/UL — SIGNIFICANT CHANGE UP (ref 150–400)
PMV BLD: 10.7 FL — SIGNIFICANT CHANGE UP (ref 7–13)
POTASSIUM SERPL-MCNC: 4.8 MMOL/L — SIGNIFICANT CHANGE UP (ref 3.5–5.3)
POTASSIUM SERPL-SCNC: 4.8 MMOL/L — SIGNIFICANT CHANGE UP (ref 3.5–5.3)
RBC # BLD: 4.47 M/UL — SIGNIFICANT CHANGE UP (ref 4.2–5.8)
RBC # FLD: 14 % — SIGNIFICANT CHANGE UP (ref 10.3–14.5)
SODIUM SERPL-SCNC: 132 MMOL/L — LOW (ref 135–145)
WBC # BLD: 7.7 K/UL — SIGNIFICANT CHANGE UP (ref 3.8–10.5)
WBC # FLD AUTO: 7.7 K/UL — SIGNIFICANT CHANGE UP (ref 3.8–10.5)

## 2020-04-15 PROCEDURE — 99223 1ST HOSP IP/OBS HIGH 75: CPT

## 2020-04-15 PROCEDURE — 99232 SBSQ HOSP IP/OBS MODERATE 35: CPT

## 2020-04-15 RX ADMIN — APIXABAN 10 MILLIGRAM(S): 2.5 TABLET, FILM COATED ORAL at 16:15

## 2020-04-15 RX ADMIN — CEFTRIAXONE 100 MILLIGRAM(S): 500 INJECTION, POWDER, FOR SOLUTION INTRAMUSCULAR; INTRAVENOUS at 16:15

## 2020-04-15 RX ADMIN — AZITHROMYCIN 500 MILLIGRAM(S): 500 TABLET, FILM COATED ORAL at 10:03

## 2020-04-15 RX ADMIN — APIXABAN 10 MILLIGRAM(S): 2.5 TABLET, FILM COATED ORAL at 04:18

## 2020-04-15 NOTE — DISCHARGE NOTE PROVIDER - CARE PROVIDER_API CALL
PCP,   PCP  Phone: (   )    -  Fax: (   )    -  Follow Up Time: Kee Velez (MD; PhD)  Cardiology; Internal Medicine; Vascular Medicine  [address] [place] [address], [facility] [address] [place] [address]  Phone: [phone]  Fax: [phone]  Follow Up Time: 1 week

## 2020-04-15 NOTE — PROGRESS NOTE ADULT - SUBJECTIVE AND OBJECTIVE BOX
[facility] Division of [facility] Hospital Medicine  Joanne Anderson MD  Pager [phone]      Patient is a 49y old  Male who presents with a chief complaint of Shortness of breath (11 Apr 2020 15:15)      SUBJECTIVE / OVERNIGHT EVENTS: Pt c/o not feeling well. He continues to have poor oral appetite and gets lightheaded and tachycardic to 130s on standing. Denies anymore chest pain, SOB, palpitations, nausea, vomiting or abdominal pain.     ADDITIONAL REVIEW OF SYSTEMS:    RESPIRATORY: No cough, wheezing, chills or hemoptysis; No shortness of breath  CARDIOVASCULAR: No chest pain, palpitations, dizziness, or leg swelling  GASTROINTESTINAL: No abdominal or epigastric pain. No nausea, vomiting, or hematemesis; No diarrhea or constipation. No melena or hematochezia.    MEDICATIONS  (STANDING):  apixaban 10 milliGRAM(s) Oral every 12 hours  azithromycin   Tablet 500 milliGRAM(s) Oral daily  cefTRIAXone   IVPB 1000 milliGRAM(s) IV Intermittent every 24 hours    MEDICATIONS  (PRN):    I&O's Summary      PHYSICAL EXAM:  T(C): 37.3 (04-15-20 @ 07:45), Max: 37.3 (04-15-20 @ 07:45)  T(F): 99.2 (04-15-20 @ 07:45), Max: 99.2 (04-15-20 @ 07:45)                        12.9   7.70  )-----------( 253      ( 15 Apr 2020 07:50 )             40.7   HR: 97 (04-15-20 @ 07:45) (97 - 97)  BP: 96/53 (04-15-20 @ 07:45) (96/53 - 96/53)  RR: 19 (04-15-20 @ 07:45) (19 - 19)  SpO2: 96% (04-15-20 @ 07:45) (96% - 96%)  Wt(kg): --    CONSTITUTIONAL: NAD, well-developed, well-groomed  EYES: PERRLA; conjunctiva and sclera clear  ENMT: Moist oral mucosa, no pharyngeal injection or exudates;   NECK: Supple, no palpable masses;  RESPIRATORY: Normal respiratory effort; basilar crackles bilaterally  CARDIOVASCULAR: Regular rate and rhythm, normal S1 and S2, no murmur/rub/gallop; No lower extremity edema; Peripheral pulses are 2+ bilaterally  ABDOMEN: Nontender to palpation, normoactive bowel sounds, no rebound/guarding;   MUSCLOSKELETAL:  Normal gait; no clubbing or cyanosis of digits; no joint swelling or tenderness to palpation  PSYCH: A+O to person, place, and time; affect appropriate  NEUROLOGY: CN 2-12 are intact and symmetric; no gross sensory deficits;   SKIN: No rashes; no palpable lesions    LABS:  04-15    132<L>  |  94<L>  |  10  ----------------------------<  102<H>  4.8   |  29  |  0.94    Ca    9.1      15 Apr 2020 07:50  Phos  3.6     04-15  Mg     2.2     04-15           PTT - ( 14 Apr 2020 11:47 )  PTT:73.8 SEC                RADIOLOGY & ADDITIONAL TESTS:  < from: Transthoracic Echocardiogram (04.13.20 @ 12:20) >  CONCLUSIONS:  1. Normal left ventricular internal dimensions and wall  thicknesses.  2. Endocardium not well visualized; grossly normal left  ventricular systolic function. Unable to evaluate for small  segmental wall motion abnormality.  3. The right ventricle is not well visualized; grossly  normal right ventricular systolic function.  ------------------------------------------------------------------------  Confirmed on  4/13/2020 - 14:35:56 by ALEXANDRE Jewell    < end of copied text >    Results Reviewed:   Imaging Personally Reviewed:  Electrocardiogram Personally Reviewed:    COORDINATION OF CARE:  Care Discussed with Consultants/Other Providers [Y/N]:  Prior or Outpatient Records Reviewed [Y/N]:

## 2020-04-15 NOTE — CHART NOTE - NSCHARTNOTEFT_GEN_A_CORE
Eliquis started for PE. Confirmed coverage with [facility]- Eliquis is $100 per month. Pt in agreement and willing to pay $100 per month. Discussed with Dr. Lawson.

## 2020-04-15 NOTE — DISCHARGE NOTE PROVIDER - NSDCFUADDAPPT_GEN_ALL_CORE_FT
Please follow up with your PCP in 1-2 weeks. Please call Dr. Kee Velez at [phone] for a telehealth appointment for one week from now.

## 2020-04-15 NOTE — DISCHARGE NOTE PROVIDER - NSDCACTIVITY_GEN_ALL_CORE
Bathing allowed/Stairs allowed/Showering allowed No heavy lifting/straining/Bathing allowed/Stairs allowed/Showering allowed/Walking - Indoors allowed/Driving allowed

## 2020-04-15 NOTE — DISCHARGE NOTE PROVIDER - NSDCMRMEDTOKEN_GEN_ALL_CORE_FT
apixaban 5 mg oral tablet: 1 tab(s) orally 2 times a day   Xarelto 20 mg oral tablet: 1 tab(s) orally once a day apixaban 5 mg oral tablet: 2 tab(s) orally every 12 hours   (total of 10 mg every 12 hours for the next 7 doses. Then 5 mg (1 tab) orally every 12 hours.  apixaban 5 mg oral tablet: 1 tab(s) orally 2 times a day   Xarelto 20 mg oral tablet: 1 tab(s) orally once a day apixaban 5 mg oral tablet: 2 tab(s) orally every 12 hours

## 2020-04-15 NOTE — DISCHARGE NOTE PROVIDER - NSFOLLOWUPCLINICS_GEN_ALL_ED_FT
[facility] General Internal Medicine  General Internal Medicine  [address] [id number]  Phone: [phone]  Fax:   Follow Up Time:

## 2020-04-15 NOTE — PROGRESS NOTE ADULT - PROBLEM SELECTOR PLAN 3
CXR with patchy ground-glass opacities and RLL consolidation.  - given COVID neg x 2, abx for possible bacterial PNA  - patient off supplemental oxygen, monitor respiratory status closely  - continue with supportive care

## 2020-04-15 NOTE — PROGRESS NOTE ADULT - PROBLEM SELECTOR PLAN 2
CTA chest with extensive bilateral pulmonary emboli with saddle thrombus identified. currently HD stable. mild hypoxia   - TTE: Did not report RV strain   - discussed with pt that he will need to f/u PCP for hypercoagulability w/u and age appropriate cancer screening.   - cont to monitor on telemetry  - c/w Eliquis 10 mg BID .

## 2020-04-15 NOTE — DISCHARGE NOTE PROVIDER - HOSPITAL COURSE
49M PMH psoriasis on monthly immunosuppressant injections of who presents with fever, shortness of breath, diarrhea. concern for COVID-19 infection, CTA chest also with saddle pulmonary emboli, admitted for further management.        Hospital course:    Pt admitted for PNA - Suspected 2019 novel coronavirus infection.  COVID negx2 but CT with COVID pattern. Pt was given IV ceftriaxone/azithromycin for possible bacterial PNA.    Pt also found to have acute saddle pulmonary embolism without acute cor pulmonale. CTA chest with extensive bilateral pulmonary emboli with saddle thrombus identified. currently HD stable. mild hypoxia. TTE without RV strain. Pt was on heparin drip then transitioned to Eliquis. Pt will need f/u with PCP for hypercoagulability w/u and age appropriate cancer screening. 49M PMH psoriasis on monthly immunosuppressant injections of who presents with fever, shortness of breath, diarrhea. concern for COVID-19 infection, CTA chest also with saddle pulmonary emboli, admitted for further management.        Hospital course:    Pt admitted for PNA - Suspected 2019 novel coronavirus infection.  COVID negx2 but CT with COVID pattern. Pt was given IV ceftriaxone/azithromycin for possible bacterial PNA.    Pt also found to have acute saddle pulmonary embolism without acute cor pulmonale. CTA chest with extensive bilateral pulmonary emboli with saddle thrombus identified. currently HD stable. mild hypoxia. TTE without RV strain. Pt was on heparin drip then transitioned to Eliquis. Pt will need f/u with PCP for hypercoagulability w/u and age appropriate cancer screening.        Bilateral Venous Doppler 4/17 49M PMH psoriasis on monthly immunosuppressant injections of who presents with fever, shortness of breath, diarrhea. concern for COVID-19 infection, CTA chest also with saddle pulmonary emboli, admitted for further management.        Hospital course:    Pt admitted for PNA - Suspected 2019 novel coronavirus infection.  COVID negx2 but CT with COVID pattern. Pt was given IV ceftriaxone/azithromycin for possible bacterial PNA.    Pt also found to have acute saddle pulmonary embolism without acute cor pulmonale. CTA chest with extensive bilateral pulmonary emboli with saddle thrombus identified. currently HD stable. mild hypoxia. TTE without RV strain. Pt was on heparin drip then transitioned to Eliquis. Pt will need f/u with PCP for hypercoagulability w/u and age appropriate cancer screening.        Bilateral Venous Doppler 4/17:    1. Extensive, non-occlusive deep vein thromboses noted    within the left common femoral, femoral, popliteal,    posterior tibial, and peroneal veins, which all appear    dilated with loss of full compressibility.  The hypoechoic    nature of the thrombi suggests that this as an acute    episode.    2.  No evidence of deep vein thrombosis in the right lower    extremity.

## 2020-04-15 NOTE — PROGRESS NOTE ADULT - PROBLEM SELECTOR PLAN 1
Patient presents with complaint of fever, shortness of breath, and diarrhea. WBC 15.21, D-dimer 4176, ferritin 1999, .3.  - COVID-19 PCR is negative x2. , discussed with ID- rec continue isolation as imaging is typical for COVID. Neg test likely d/t low viral load in nasal pharynx   - continue with supportive care  -  monitor vitals  - will D/c the patient home tomorrow as the patient c/o lightheadedness and becomes tachy to 130s.

## 2020-04-15 NOTE — DISCHARGE NOTE PROVIDER - CARE PROVIDERS DIRECT ADDRESSES
,DirectAddress_Unknown ,[email or web address]

## 2020-04-15 NOTE — DISCHARGE NOTE PROVIDER - NSDCCPCAREPLAN_GEN_ALL_CORE_FT
PRINCIPAL DISCHARGE DIAGNOSIS  Diagnosis: Suspected 2019 novel coronavirus infection  Assessment and Plan of Treatment: Your COVID was negative however this can be a false negative so you were assumed to have COVID. You must self quarantine to complete a 14 day time period.  Monitor for fevers, shortness of breath and cough primarily.  Monitor your temperature daily to not any changes and increases.    It has been determined that you no longer need hospitalization and can recover while remaining in self-quarantine at home. You should follow the prevention steps below until a healthcare provider or local or state health department says you can return to your normal activities.  1. You should restrict activities outside your home, except for getting medical care.  2. Do not go to work, school, or public areas.  3. Avoid using public transportation, ride-sharing, or taxis.  4. Separate yourself from other people and animals in your home.  5. Call ahead before visiting your doctor.  6. Wear a facemask.  7. Cover your coughs and sneezes.  8. Clean your hands often.  9. Avoid sharing personal household items.  10. Clean all “high-touch” surfaces everyday.  11. Monitor your symptoms.  If you have a medical emergency and need to call 911, notify the dispatch personnel that you have COVID-19 If possible, put on a facemask before emergency medical services arrive.  12. Stopping home isolation.  Patients with confirmed COVID-19 should remain under home isolation precautions for 14 days since the positive COVID-19 test and until the risk of secondary transmission to others is thought to be low. The decision to discontinue home isolation precautions should be made on a case-by-case basis, in consultation with healthcare providers and state and local health departments. Your [facility] Department of Health can be reached at [phone] for further information about COVID-19.      SECONDARY DISCHARGE DIAGNOSES  Diagnosis: Acute saddle pulmonary embolism without acute cor pulmonale  Assessment and Plan of Treatment: You were found to have a lung clot. You had an echo done that was normal. You were started on Eliquis (blood thinner). Please follow up with your PCP for hypercoagulability workup and age appropriate cancer screening. PRINCIPAL DISCHARGE DIAGNOSIS  Diagnosis: Acute saddle pulmonary embolism  Assessment and Plan of Treatment: You were found to have a lung clot.  You had an echocardiogram done that was normal.  You were started on apixaban (Eliquis) loading dosage which is 10mg twice a day for 14 doses.  You have received 7 doses of the 14 during your hospitalization.  You will still require 7 more doses of 10mg twice a day.  Then, when finished, continue to take Eliquis 5mg twice per day.    Loading dose schedule:  In hospital:  7 doses completed  4/18 dose 1 in morning  4/19 dose 2 in evening  4/20 dose 3 in morning  4/20 dose 4 in evening  4/21 dose 5 in morning  4/22 dose 6 in evening  4/23 dose 7 in morning  Start 5 mg evening of 4/23 and thereafter continue 5 mg once in the morning and once in the evening.        SECONDARY DISCHARGE DIAGNOSES  Diagnosis: Acute deep vein thrombosis  Assessment and Plan of Treatment: You were found to have an extensive left lower extremity deep vein thrombosis.  Continue medication regimen as noted for pulmonary embolism as the treatment is the same.   Avoid massaging left leg as this could dislodge clot.  Elevate legs as this will promote blood flow from the lower extremities to your heart.    You will need to follow up with your primary care physician and/or hematologist for further outpatient workup to identify the cause of the blood clot.        Diagnosis: Acute saddle pulmonary embolism without acute cor pulmonale  Assessment and Plan of Treatment: You were found to have a lung clot. You had an echo done that was normal. You were started on Eliquis (blood thinner). Please follow up with your PCP for hypercoagulability workup and age appropriate cancer screening. PRINCIPAL DISCHARGE DIAGNOSIS  Diagnosis: Acute saddle pulmonary embolism  Assessment and Plan of Treatment: You were found to have a lung clot.  You had an echocardiogram done that was normal.  You were started on apixaban (Eliquis) loading dosage which is 10mg twice a day for 14 doses.  You have received 7 doses of the 14 during your hospitalization.  You will still require 7 more doses of 10mg twice a day.  Then, when finished, continue to take Eliquis 5mg twice per day.    Loading dose schedule:  In hospital:  7 doses of 10 mg twice a day completed.    4/18 - - 10 mg (dose 1) in morning   4/18 - - 10 mg (dose 2)  in evening  4/19  - - 10mg (dose 3) in morning  4/19 - -  10mg (dose 4) in evening  4/20 - - 10 mg (dose 5) in morning  4/20 - -  10 mg (dose 6)  in evening  4/21 - -  10mg (dose 7)   in morning  4/22 - -  Start 5 mg evening of 4/22 and thereafter continue 5 mg once in the morning and once in the evening.        SECONDARY DISCHARGE DIAGNOSES  Diagnosis: Acute deep vein thrombosis  Assessment and Plan of Treatment: You were found to have an extensive left lower extremity deep vein thrombosis.  Continue medication regimen as noted for pulmonary embolism as the treatment is the same.   Avoid massaging left leg as this could dislodge clot.  Elevate legs as this will promote blood flow from the lower extremities to your heart.    You will need to follow up with your primary care physician and/or hematologist for further outpatient workup to identify the cause of the blood clot.        Diagnosis: Acute saddle pulmonary embolism without acute cor pulmonale  Assessment and Plan of Treatment: You were found to have a lung clot. You had an echo done that was normal. You were started on Eliquis (blood thinner). Please follow up with your PCP for hypercoagulability workup and age appropriate cancer screening. PRINCIPAL DISCHARGE DIAGNOSIS  Diagnosis: Acute saddle pulmonary embolism  Assessment and Plan of Treatment: You were found to have a lung clot.  You had an echocardiogram done that was normal.  You were started on apixaban (Eliquis) loading dosage which is 10mg twice a day for 14 doses.  You have received 7 doses of the 14 during your hospitalization.  You will still require 7 more doses of 10mg twice a day.  Then, when finished, continue to take Eliquis 5mg twice per day.    Loading dose schedule:  In hospital:  7 doses of 10 mg twice a day completed.    4/18 - - 10 mg (dose 1) in morning   4/18 - - 10 mg (dose 2)  in evening  4/19  - - 10mg (dose 3) in morning  4/19 - -  10mg (dose 4) in evening  4/20 - - 10 mg (dose 5) in morning  4/20 - -  10 mg (dose 6)  in evening  4/21 - -  10mg (dose 7)   in morning  4/21 - -  Start 5 mg evening of 4/21 and thereafter continue 5 mg once in the morning and once in the evening.        SECONDARY DISCHARGE DIAGNOSES  Diagnosis: Acute deep vein thrombosis  Assessment and Plan of Treatment: You were found to have an extensive left lower extremity deep vein thrombosis.  Continue medication regimen as noted for pulmonary embolism as the treatment is the same.   Avoid massaging left leg as this could dislodge clot.  Elevate legs as this will promote blood flow from the lower extremities to your heart.    You will need to follow up with your primary care physician and/or hematologist for further outpatient workup to identify the cause of the blood clot.        Diagnosis: Acute saddle pulmonary embolism without acute cor pulmonale  Assessment and Plan of Treatment: You were found to have a lung clot. You had an echo done that was normal. You were started on Eliquis (blood thinner). Please follow up with your PCP for hypercoagulability workup and age appropriate cancer screening.

## 2020-04-15 NOTE — DISCHARGE NOTE PROVIDER - PROVIDER TOKENS
FREE:[LAST:[PCP],PHONE:[(   )    -],FAX:[(   )    -],ADDRESS:[PCP]] PROVIDER:[TOKEN:[[id number]],FOLLOWUP:[1 week]]

## 2020-04-16 DIAGNOSIS — R00.0 TACHYCARDIA, UNSPECIFIED: ICD-10-CM

## 2020-04-16 LAB
ALBUMIN SERPL ELPH-MCNC: 3.2 G/DL — LOW (ref 3.3–5)
ALP SERPL-CCNC: 60 U/L — SIGNIFICANT CHANGE UP (ref 40–120)
ALT FLD-CCNC: 34 U/L — SIGNIFICANT CHANGE UP (ref 4–41)
ANION GAP SERPL CALC-SCNC: 9 MMO/L — SIGNIFICANT CHANGE UP (ref 7–14)
APPEARANCE UR: CLEAR — SIGNIFICANT CHANGE UP
AST SERPL-CCNC: 47 U/L — HIGH (ref 4–40)
BACTERIA # UR AUTO: NEGATIVE — SIGNIFICANT CHANGE UP
BILIRUB SERPL-MCNC: 0.5 MG/DL — SIGNIFICANT CHANGE UP (ref 0.2–1.2)
BILIRUB UR-MCNC: NEGATIVE — SIGNIFICANT CHANGE UP
BLOOD UR QL VISUAL: NEGATIVE — SIGNIFICANT CHANGE UP
BUN SERPL-MCNC: 9 MG/DL — SIGNIFICANT CHANGE UP (ref 7–23)
CALCIUM SERPL-MCNC: 9.3 MG/DL — SIGNIFICANT CHANGE UP (ref 8.4–10.5)
CHLORIDE SERPL-SCNC: 97 MMOL/L — LOW (ref 98–107)
CO2 SERPL-SCNC: 27 MMOL/L — SIGNIFICANT CHANGE UP (ref 22–31)
COLOR SPEC: YELLOW — SIGNIFICANT CHANGE UP
CREAT SERPL-MCNC: 0.86 MG/DL — SIGNIFICANT CHANGE UP (ref 0.5–1.3)
GLUCOSE SERPL-MCNC: 100 MG/DL — HIGH (ref 70–99)
GLUCOSE UR-MCNC: NEGATIVE — SIGNIFICANT CHANGE UP
HCT VFR BLD CALC: 39.7 % — SIGNIFICANT CHANGE UP (ref 39–50)
HGB BLD-MCNC: 12.7 G/DL — LOW (ref 13–17)
KETONES UR-MCNC: NEGATIVE — SIGNIFICANT CHANGE UP
LEUKOCYTE ESTERASE UR-ACNC: NEGATIVE — SIGNIFICANT CHANGE UP
MAGNESIUM SERPL-MCNC: 2.3 MG/DL — SIGNIFICANT CHANGE UP (ref 1.6–2.6)
MCHC RBC-ENTMCNC: 28.9 PG — SIGNIFICANT CHANGE UP (ref 27–34)
MCHC RBC-ENTMCNC: 32 % — SIGNIFICANT CHANGE UP (ref 32–36)
MCV RBC AUTO: 90.4 FL — SIGNIFICANT CHANGE UP (ref 80–100)
MUCOUS THREADS # UR AUTO: SIGNIFICANT CHANGE UP
NITRITE UR-MCNC: NEGATIVE — SIGNIFICANT CHANGE UP
NRBC # FLD: 0 K/UL — SIGNIFICANT CHANGE UP (ref 0–0)
PH UR: 6 — SIGNIFICANT CHANGE UP (ref 5–8)
PHOSPHATE SERPL-MCNC: 3.6 MG/DL — SIGNIFICANT CHANGE UP (ref 2.5–4.5)
PLATELET # BLD AUTO: 300 K/UL — SIGNIFICANT CHANGE UP (ref 150–400)
PMV BLD: 10.4 FL — SIGNIFICANT CHANGE UP (ref 7–13)
POTASSIUM SERPL-MCNC: 4.2 MMOL/L — SIGNIFICANT CHANGE UP (ref 3.5–5.3)
POTASSIUM SERPL-SCNC: 4.2 MMOL/L — SIGNIFICANT CHANGE UP (ref 3.5–5.3)
PROT SERPL-MCNC: 7.8 G/DL — SIGNIFICANT CHANGE UP (ref 6–8.3)
PROT UR-MCNC: 30 — SIGNIFICANT CHANGE UP
RBC # BLD: 4.39 M/UL — SIGNIFICANT CHANGE UP (ref 4.2–5.8)
RBC # FLD: 13.9 % — SIGNIFICANT CHANGE UP (ref 10.3–14.5)
RBC CASTS # UR COMP ASSIST: SIGNIFICANT CHANGE UP (ref 0–?)
SODIUM SERPL-SCNC: 133 MMOL/L — LOW (ref 135–145)
SP GR SPEC: 1.03 — SIGNIFICANT CHANGE UP (ref 1–1.04)
SQUAMOUS # UR AUTO: SIGNIFICANT CHANGE UP
UROBILINOGEN FLD QL: NORMAL — SIGNIFICANT CHANGE UP
WBC # BLD: 7.3 K/UL — SIGNIFICANT CHANGE UP (ref 3.8–10.5)
WBC # FLD AUTO: 7.3 K/UL — SIGNIFICANT CHANGE UP (ref 3.8–10.5)
WBC UR QL: SIGNIFICANT CHANGE UP (ref 0–?)

## 2020-04-16 PROCEDURE — 99233 SBSQ HOSP IP/OBS HIGH 50: CPT

## 2020-04-16 PROCEDURE — 99232 SBSQ HOSP IP/OBS MODERATE 35: CPT

## 2020-04-16 RX ORDER — ACETAMINOPHEN 500 MG
650 TABLET ORAL EVERY 6 HOURS
Refills: 0 | Status: DISCONTINUED | OUTPATIENT
Start: 2020-04-16 | End: 2020-04-17

## 2020-04-16 RX ORDER — ACETAMINOPHEN 500 MG
1000 TABLET ORAL ONCE
Refills: 0 | Status: COMPLETED | OUTPATIENT
Start: 2020-04-16 | End: 2020-04-16

## 2020-04-16 RX ADMIN — Medication 650 MILLIGRAM(S): at 21:46

## 2020-04-16 RX ADMIN — APIXABAN 10 MILLIGRAM(S): 2.5 TABLET, FILM COATED ORAL at 16:43

## 2020-04-16 RX ADMIN — APIXABAN 10 MILLIGRAM(S): 2.5 TABLET, FILM COATED ORAL at 04:13

## 2020-04-16 RX ADMIN — Medication 400 MILLIGRAM(S): at 03:01

## 2020-04-16 NOTE — PROGRESS NOTE ADULT - SUBJECTIVE AND OBJECTIVE BOX
[facility] Division of Hospital Medicine  Joanne Anderson MD  Pager [phone]      Patient is a 49y old  Male who presents with a chief complaint of Shortness of breath (11 Apr 2020 15:15)      SUBJECTIVE / OVERNIGHT EVENTS: Pt c/o not feeling well. He continues to have poor oral appetite and gets lightheaded and tachycardic on standing and taking few steps. Reviewed telemetry HR going up to 150s on standing and going to the bathroom.  Denies anymore chest pain, nausea, vomiting or abdominal pain.     ADDITIONAL REVIEW OF SYSTEMS:    RESPIRATORY: No cough, wheezing, chills or hemoptysis; No shortness of breath  CARDIOVASCULAR: No chest pain, palpitations, dizziness, or leg swelling  GASTROINTESTINAL: No abdominal or epigastric pain. No nausea, vomiting, or hematemesis; No diarrhea or constipation. No melena or hematochezia.    MEDICATIONS  (STANDING):  apixaban 10 milliGRAM(s) Oral every 12 hours    MEDICATIONS  (PRN):    I&O's Summary      PHYSICAL EXAM:  T(C): 36.6 (04-16-20 @ 09:40), Max: 38.7 (04-16-20 @ 01:22)  T(F): 97.9 (04-16-20 @ 09:40), Max: 101.7 (04-16-20 @ 01:22)  HR: 102 (04-16-20 @ 09:40) (100 - 150)  BP: 122/81 (04-16-20 @ 09:40) (103/60 - 122/81)  RR: 20 (04-16-20 @ 13:23) (19 - 20)  SpO2: 97% (04-16-20 @ 13:23) (94% - 97%)  Wt(kg): --    CONSTITUTIONAL: NAD, well-developed, well-groomed  EYES: PERRLA; conjunctiva and sclera clear  ENMT: Moist oral mucosa, no pharyngeal injection or exudates;   NECK: Supple, no palpable masses;  RESPIRATORY: Normal respiratory effort; basilar crackles bilaterally  CARDIOVASCULAR: Regular rate and rhythm, normal S1 and S2, no murmur/rub/gallop; No lower extremity edema; Peripheral pulses are 2+ bilaterally  ABDOMEN: Nontender to palpation, normoactive bowel sounds, no rebound/guarding;   MUSCLOSKELETAL:  Normal gait; no clubbing or cyanosis of digits; no joint swelling or tenderness to palpation  PSYCH: A+O to person, place, and time; affect appropriate  NEUROLOGY: CN 2-12 are intact and symmetric; no gross sensory deficits;   SKIN: No rashes; no palpable lesions    LABS:                        12.7   7.30  )-----------( 300      ( 16 Apr 2020 08:46 )             39.7   04-16    133<L>  |  97<L>  |  9   ----------------------------<  100<H>  4.2   |  27  |  0.86    Ca    9.3      16 Apr 2020 08:46  Phos  3.6     04-16  Mg     2.3     04-16    TPro  7.8  /  Alb  3.2<L>  /  TBili  0.5  /  DBili  x   /  AST  47<H>  /  ALT  34  /  AlkPhos  60  04-16             PTT - ( 14 Apr 2020 11:47 )  PTT:73.8 SEC                RADIOLOGY & ADDITIONAL TESTS:  < from: Transthoracic Echocardiogram (04.13.20 @ 12:20) >  CONCLUSIONS:  1. Normal left ventricular internal dimensions and wall  thicknesses.  2. Endocardium not well visualized; grossly normal left  ventricular systolic function. Unable to evaluate for small  segmental wall motion abnormality.  3. The right ventricle is not well visualized; grossly  normal right ventricular systolic function.  ------------------------------------------------------------------------  Confirmed on  4/13/2020 - 14:35:56 by ALEXANDRE Jewell    < end of copied text >    Results Reviewed:   Imaging Personally Reviewed:  Electrocardiogram Personally Reviewed:    COORDINATION OF CARE:  Care Discussed with Consultants/Other Providers [Y/N]:  Prior or Outpatient Records Reviewed [Y/N]:

## 2020-04-16 NOTE — CONSULT NOTE ADULT - SUBJECTIVE AND OBJECTIVE BOX
Date of Admission: 4/10/20    Patient is a 49y old  Male who presents with a chief complaint of Shortness of breath.    HISTORY OF PRESENT ILLNESS:   49M with psoriasis on monthly immunosuppressant injections presented with SOB and cough. He had these symptoms for about 3 weeks. He also had some diarrhea during this time but no abd pain or n/v. He came to the ED due to worsening SOB to the point that he couldn't take more than 3 steps without stopping. Denies CP, palp, dizziness, syncope. He was found to have a saddle PE on CTA and is now s/p hep gtt. He was transitioned to eliquis in preparation for discharge but this morning continues to have symptoms. He remains SOB, although improved, and gets tachycardic and dizzy when standing up. No symptoms at rest.    Allergies  No Known Allergies      MEDICATIONS:  apixaban 10 milliGRAM(s) Oral every 12 hours        PAST MEDICAL & SURGICAL HISTORY:  Psoriasis  No significant past surgical history      FAMILY HISTORY:  No pertinent family history in first degree relatives    REVIEW OF SYSTEMS:  CONSTITUTIONAL: No weakness, fevers or chills  EYES/ENT: No visual changes;  No dysphagia  RESPIRATORY: No cough, wheezing, hemoptysis; No shortness of breath  CARDIOVASCULAR: No chest pain or palpitations; No lower extremity edema  GASTROINTESTINAL: No abdominal or epigastric pain. No nausea, vomiting, or hematemesis  GENITOURINARY: No dysuria, frequency or hematuria  NEUROLOGICAL: No numbness or weakness  SKIN: No itching, burning, rashes, or lesions  HEME: No bleeding or bruising  MSK: No joint pains or muscle pains  All other review of systems is negative unless indicated above.    PHYSICAL EXAM:  T(C): 36.6 (04-16-20 @ 09:40), Max: 38.7 (04-16-20 @ 01:22)  HR: 102 (04-16-20 @ 09:40) (100 - 150)  BP: 122/81 (04-16-20 @ 09:40) (103/60 - 122/81)  RR: 20 (04-16-20 @ 09:40) (19 - 20)  SpO2: 94% (04-16-20 @ 09:40) (94% - 96%)  Wt(kg): --  I&O's Summary      Due to current pandemic and to limit exposure to staff, please refer to the physical exam by primary provider.        LABS:	 	    CBC Full  -  ( 16 Apr 2020 08:46 )  WBC Count : 7.30 K/uL  Hemoglobin : 12.7 g/dL  Hematocrit : 39.7 %  Platelet Count - Automated : 300 K/uL  Mean Cell Volume : 90.4 fL  Mean Cell Hemoglobin : 28.9 pg  Mean Cell Hemoglobin Concentration : 32.0 %  Auto Neutrophil # : x  Auto Lymphocyte # : x  Auto Monocyte # : x  Auto Eosinophil # : x  Auto Basophil # : x  Auto Neutrophil % : x  Auto Lymphocyte % : x  Auto Monocyte % : x  Auto Eosinophil % : x  Auto Basophil % : x    04-16    133<L>  |  97<L>  |  9   ----------------------------<  100<H>  4.2   |  27  |  0.86  04-15    132<L>  |  94<L>  |  10  ----------------------------<  102<H>  4.8   |  29  |  0.94    Ca    9.3      16 Apr 2020 08:46  Ca    9.1      15 Apr 2020 07:50  Phos  3.6     04-16  Phos  3.6     04-15  Mg     2.3     04-16  Mg     2.2     04-15    TPro  7.8  /  Alb  3.2<L>  /  TBili  0.5  /  DBili  x   /  AST  47<H>  /  ALT  34  /  AlkPhos  60  04-16    CARDIAC MARKERS:  hs trop x2 neg    CKMB: < 1.00 ng/mL (04-13 @ 20:44)    CKMB Relative Index: Test not performed (04-13 @ 20:44)    ECG:  	sinus tachycardia    PREVIOUS DIAGNOSTIC TESTING:    Echo 4/13/20  1. Normal left ventricular internal dimensions and wall  thicknesses.  2. Endocardium not well visualized; grossly normal left  ventricular systolic function. Unable to evaluate for small  segmental wall motion abnormality.  3. The right ventricle is not well visualized; grossly  normal right ventricular systolic function.      ASSESSMENT/PLAN: 	  49M with psoriasis on monthly immunosuppressant injections presented with SOB and cough found to have saddle PE and likely COVID-19.    Saddle PE  - remains normotensive and asymptomatic at rest; however, becomes tachycardic and dizzy on standing/ambulation; currently on RA  - likely in setting of COVID; although swab x2 was negative, he has known exposure and CT scan findings consistent w/ COVID infection  - s/p hep gtt from 4/10-4/14, now on eliquis 10mg BID  - c/w eliquis  - please obtain bilateral lower extremity dopplers to assess clot burden  - would monitor until symptoms resolve/improve significantly       Rober Thakur MD  Cardiology Fellow   [phone], M-F 7:30A-5P    All Cardiology service information can be found on [email or web address], password: [email or web address]. Date of Admission: 4/10/20    Patient is a 49y old  Male who presents with a chief complaint of Shortness of breath.    HISTORY OF PRESENT ILLNESS:   49M with psoriasis on monthly immunosuppressant injections presented with SOB and cough. He had these symptoms for about 3 weeks. He also had some diarrhea during this time but no abd pain or n/v. He came to the ED due to worsening SOB to the point that he couldn't take more than 3 steps without stopping. Denies CP, palp, dizziness, syncope. He was found to have a saddle PE on CTA and is now s/p hep gtt. He was transitioned to eliquis in preparation for discharge but this morning continues to have symptoms. He remains SOB, although improved, and gets tachycardic and dizzy when standing up. No symptoms at rest.    Allergies  No Known Allergies      MEDICATIONS:  apixaban 10 milliGRAM(s) Oral every 12 hours        PAST MEDICAL & SURGICAL HISTORY:  Psoriasis  No significant past surgical history      FAMILY HISTORY:  No pertinent family history in first degree relatives    REVIEW OF SYSTEMS:  CONSTITUTIONAL: No weakness, fevers or chills  EYES/ENT: No visual changes;  No dysphagia  RESPIRATORY: No cough, wheezing, hemoptysis; No shortness of breath  CARDIOVASCULAR: No chest pain or palpitations; No lower extremity edema  GASTROINTESTINAL: No abdominal or epigastric pain. No nausea, vomiting, or hematemesis  GENITOURINARY: No dysuria, frequency or hematuria  NEUROLOGICAL: No numbness or weakness  SKIN: No itching, burning, rashes, or lesions  HEME: No bleeding or bruising  MSK: No joint pains or muscle pains  All other review of systems is negative unless indicated above.    PHYSICAL EXAM:  T(C): 36.6 (04-16-20 @ 09:40), Max: 38.7 (04-16-20 @ 01:22)  HR: 102 (04-16-20 @ 09:40) (100 - 150)  BP: 122/81 (04-16-20 @ 09:40) (103/60 - 122/81)  RR: 20 (04-16-20 @ 09:40) (19 - 20)  SpO2: 94% (04-16-20 @ 09:40) (94% - 96%)  Wt(kg): --  I&O's Summary      Due to current pandemic and to limit exposure to staff, please refer to the physical exam by primary provider.        LABS:	 	    CBC Full  -  ( 16 Apr 2020 08:46 )  WBC Count : 7.30 K/uL  Hemoglobin : 12.7 g/dL  Hematocrit : 39.7 %  Platelet Count - Automated : 300 K/uL  Mean Cell Volume : 90.4 fL  Mean Cell Hemoglobin : 28.9 pg  Mean Cell Hemoglobin Concentration : 32.0 %  Auto Neutrophil # : x  Auto Lymphocyte # : x  Auto Monocyte # : x  Auto Eosinophil # : x  Auto Basophil # : x  Auto Neutrophil % : x  Auto Lymphocyte % : x  Auto Monocyte % : x  Auto Eosinophil % : x  Auto Basophil % : x    04-16    133<L>  |  97<L>  |  9   ----------------------------<  100<H>  4.2   |  27  |  0.86  04-15    132<L>  |  94<L>  |  10  ----------------------------<  102<H>  4.8   |  29  |  0.94    Ca    9.3      16 Apr 2020 08:46  Ca    9.1      15 Apr 2020 07:50  Phos  3.6     04-16  Phos  3.6     04-15  Mg     2.3     04-16  Mg     2.2     04-15    TPro  7.8  /  Alb  3.2<L>  /  TBili  0.5  /  DBili  x   /  AST  47<H>  /  ALT  34  /  AlkPhos  60  04-16    CARDIAC MARKERS:  hs trop x2 neg    CKMB: < 1.00 ng/mL (04-13 @ 20:44)    CKMB Relative Index: Test not performed (04-13 @ 20:44)    ECG:  	sinus tachycardia    PREVIOUS DIAGNOSTIC TESTING:    Echo 4/13/20  1. Normal left ventricular internal dimensions and wall  thicknesses.  2. Endocardium not well visualized; grossly normal left  ventricular systolic function. Unable to evaluate for small  segmental wall motion abnormality.  3. The right ventricle is not well visualized; grossly  normal right ventricular systolic function.      ASSESSMENT/PLAN: 	  49M with psoriasis on monthly immunosuppressant injections presented with SOB and cough found to have saddle PE and likely COVID-19.    Saddle PE  - remains normotensive and asymptomatic at rest; however, becomes tachycardic and dizzy on standing/ambulation; currently on RA  - likely in setting of COVID; although swab x2 was negative, he has known exposure and CT scan findings consistent w/ COVID infection  - neg trops, no RV strain on echo  - s/p hep gtt from 4/10-4/14, now on eliquis 10mg BID  - c/w eliquis  - please obtain bilateral lower extremity dopplers to assess clot burden  - would monitor until symptoms resolve/improve significantly       Rober Thakur MD  Cardiology Fellow   [phone], M-F 7:30A-5P    All Cardiology service information can be found on [email or web address], password: [email or web address].

## 2020-04-16 NOTE — PROGRESS NOTE ADULT - PROBLEM SELECTOR PLAN 1
Pt gets tachycardic and symptomatic on standing and ambulation  Cardio recs appreciated: B/L LE doppler to assess clot burden  Pt not ready for discharge  Cont to monitor on telemetry

## 2020-04-16 NOTE — PROGRESS NOTE ADULT - PROBLEM SELECTOR PLAN 2
Patient presents with complaint of fever, shortness of breath, and diarrhea.   - COVID-19 PCR is negative x2. ,   - ID- rec isolation as imaging is typical for COVID. Neg test likely d/t low viral load in nasal pharynx   - continue with supportive care  -  monitor vitals

## 2020-04-16 NOTE — PROGRESS NOTE ADULT - PROBLEM SELECTOR PLAN 3
CTA chest with extensive bilateral pulmonary emboli with saddle thrombus identified. currently HD stable. mild hypoxia   - TTE: Did not report RV strain   - discussed with pt that he will need to f/u PCP for hypercoagulability w/u and age appropriate cancer screening.   - c/w Eliquis 10 mg BID .

## 2020-04-16 NOTE — PROGRESS NOTE ADULT - PROBLEM SELECTOR PLAN 4
CXR with patchy ground-glass opacities and RLL consolidation.  - given COVID neg x 2, abx for possible bacterial PNA  - s/p Azithro and Rocephin

## 2020-04-17 ENCOUNTER — TRANSCRIPTION ENCOUNTER (OUTPATIENT)
Age: 50
End: 2020-04-17

## 2020-04-17 VITALS
SYSTOLIC BLOOD PRESSURE: 136 MMHG | OXYGEN SATURATION: 96 % | DIASTOLIC BLOOD PRESSURE: 82 MMHG | TEMPERATURE: 98 F | HEART RATE: 102 BPM | RESPIRATION RATE: 20 BRPM

## 2020-04-17 LAB
APTT BLD: 43 SEC — HIGH (ref 27.5–36.3)
HCT VFR BLD CALC: 41 % — SIGNIFICANT CHANGE UP (ref 39–50)
HGB BLD-MCNC: 13.3 G/DL — SIGNIFICANT CHANGE UP (ref 13–17)
MCHC RBC-ENTMCNC: 29.1 PG — SIGNIFICANT CHANGE UP (ref 27–34)
MCHC RBC-ENTMCNC: 32.4 % — SIGNIFICANT CHANGE UP (ref 32–36)
MCV RBC AUTO: 89.7 FL — SIGNIFICANT CHANGE UP (ref 80–100)
NRBC # FLD: 0 K/UL — SIGNIFICANT CHANGE UP (ref 0–0)
PLATELET # BLD AUTO: 310 K/UL — SIGNIFICANT CHANGE UP (ref 150–400)
PMV BLD: 10.8 FL — SIGNIFICANT CHANGE UP (ref 7–13)
RBC # BLD: 4.57 M/UL — SIGNIFICANT CHANGE UP (ref 4.2–5.8)
RBC # FLD: 14.1 % — SIGNIFICANT CHANGE UP (ref 10.3–14.5)
WBC # BLD: 7.6 K/UL — SIGNIFICANT CHANGE UP (ref 3.8–10.5)
WBC # FLD AUTO: 7.6 K/UL — SIGNIFICANT CHANGE UP (ref 3.8–10.5)

## 2020-04-17 PROCEDURE — 93970 EXTREMITY STUDY: CPT | Mod: 26

## 2020-04-17 RX ORDER — APIXABAN 2.5 MG/1
2 TABLET, FILM COATED ORAL
Qty: 0 | Refills: 0 | DISCHARGE
Start: 2020-04-17

## 2020-04-17 RX ORDER — APIXABAN 2.5 MG/1
2 TABLET, FILM COATED ORAL
Qty: 60 | Refills: 0
Start: 2020-04-17

## 2020-04-17 RX ADMIN — APIXABAN 10 MILLIGRAM(S): 2.5 TABLET, FILM COATED ORAL at 06:08

## 2020-04-17 RX ADMIN — APIXABAN 10 MILLIGRAM(S): 2.5 TABLET, FILM COATED ORAL at 18:28

## 2020-04-17 NOTE — PROGRESS NOTE ADULT - PROBLEM SELECTOR PLAN 3
CTA chest with extensive bilateral pulmonary emboli with saddle thrombus identified. currently HD stable. mild hypoxia   - TTE: Did not report RV strain   - discussed with pt that he will need to f/u PCP for hypercoagulability w/u and age appropriate cancer screening.   - c/w Eliquis 10 mg BID for total of 7 days and then 5 mg BID.

## 2020-04-17 NOTE — PROGRESS NOTE ADULT - PROBLEM SELECTOR PLAN 1
Improved, symptoms improved  Cardio recs appreciated: B/L LE doppler: acute extensive LLE DVT  No contraindication to discharge home as per cardio

## 2020-04-17 NOTE — PROGRESS NOTE ADULT - SUBJECTIVE AND OBJECTIVE BOX
Cardiology Attending Progress Note    Feels better this AM  LE venous duplex US demonstrated extensive acute LLE DVT  On appropriate regimen at this time including DOAC (apixaban loading dose at this time)    Less tachycardic on telemetry, sinus rhythm, sinus tachycardia low 100s bpm    Vital Signs Last 24 Hrs  T(C): 37.2 (16 Apr 2020 21:33), Max: 37.2 (16 Apr 2020 21:33)  T(F): 99 (16 Apr 2020 21:33), Max: 99 (16 Apr 2020 21:33)  HR: 112 (16 Apr 2020 21:33) (112 - 112)  BP: 126/77 (16 Apr 2020 21:33) (126/77 - 126/77)  BP(mean): --  RR: 18 (16 Apr 2020 21:33) (18 - 18)  SpO2: 95% (16 Apr 2020 21:33) (95% - 95%)    NAD, in bed  Reg S1S2  Minimal edema LLE    MEDICATIONS  (STANDING):  apixaban 10 milliGRAM(s) Oral every 12 hours    MEDICATIONS  (PRN):  acetaminophen   Tablet .. 650 milliGRAM(s) Oral every 6 hours PRN Temp greater or equal to 38C (100.4F), Moderate Pain (4 - 6)      LABS:	 	                          13.3   7.60  )-----------( 310      ( 17 Apr 2020 06:15 )             41.0   04-16    133<L>  |  97<L>  |  9   ----------------------------<  100<H>  4.2   |  27  |  0.86    Ca    9.3      16 Apr 2020 08:46  Phos  3.6     04-16  Mg     2.3     04-16    TPro  7.8  /  Alb  3.2<L>  /  TBili  0.5  /  DBili  x   /  AST  47<H>  /  ALT  34  /  AlkPhos  60  04-16    PTT - ( 17 Apr 2020 06:15 )  PTT:43.0 SEC      ECG:  	sinus tachycardia    PREVIOUS DIAGNOSTIC TESTING:    Echo 4/13/20  1. Normal left ventricular internal dimensions and wall  thicknesses.  2. Endocardium not well visualized; grossly normal left  ventricular systolic function. Unable to evaluate for small  segmental wall motion abnormality.  3. The right ventricle is not well visualized; grossly  normal right ventricular systolic function.      < from: VA Duplex Ext Veins Lower Comp, Bilat. (04.17.20 @ 10:19) >    Patient name: JESSICA YAP  Date of test: 4/17/2020  MR#: [id number]  [facility] #: [id number]    Location: [facility] Physician(s): , Keke Jorgensen MD  Interpreted by: Kee Velez MD, [facility], [facility]  Tech: Sarah Cordero [facility]  Type of Test: Lower Extremity Venous  ------------------------------------------------------------------------  Procedure: Real-time grayscale and color Duplex  ultrasonography was used to interrogate the deep veins of  the bilateral lower extremities.  Indications: Localized swelling, mass and lump, lower  limb, bilateral (R22.43)  ------------------------------------------------------------------------  RESULT:  ------------------------------------------------------------------------  RIGHT:  Deep venous thrombosis: No  Superficial venous thrombosis: (Not Examined)  Deep venous insufficiency: (Not Examined)  Superficial venous insufficiency: (Not Examined)  ------------------------------------------------------------------------  LEFT:  Deep venous thrombosis: Yes  Superficial venous thrombosis: (Not Examined)  Deep venous insufficiency: (Not Examined)  Superficial venous insufficiency: (Not Examined)  ------------------------------------------------------------------------  Right Findings: No evidence of thrombosis in the right  lower extremity.  The right common femoral, femoral,  popliteal, gastrocnemius, posterior tibial, and peroneal  veins appear sonographically normal and compressible,  without evidence of thrombosis.  Left Findings: Extensive, non-occlusive thromboses noted  within the left common femoral, femoral, popliteal,  posterior tibial, and peroneal veins, which all appear  dilated with loss of full compressibility.  The hypoechoic  nature of the thrombi sggests that this as an acute  episode.  The left gastrocnemius veins were otherwise normal and  compressible, without evidence of thrombosis.  ------------------------------------------------------------------------  Summary/Impressions:  1. Extensive, non-occlusive deep vein thromboses noted  within the left common femoral, femoral, popliteal,  posterior tibial, and peroneal veins, which all appear  dilated with loss of full compressibility.  The hypoechoic  nature of the thrombi sggests that this as an acute  episode.  2.No evidence of deep vein thrombosis in the right lower  extremity.  Results communicated with CANDY Kulkarni on 04/17/20 at 11  AM.  ------------------------------------------------------------------------  Confirmed on  4/17/2020 - 12:06 PM by Kee Velez MD,  FAC, ALBA, RPVI  By signing this report, the attending physician certifies  that he or she has personally supervised and interpreted  the vascular study and has reviewed and or edited and  agrees with the written comments contained withinthe  report.    < end of copied text >

## 2020-04-17 NOTE — PROGRESS NOTE ADULT - SUBJECTIVE AND OBJECTIVE BOX
[facility] Division of [facility] Medicine  Joanne Anderson MD  Pager [phone]      Patient is a 49y old  Male who presents with a chief complaint of Shortness of breath (11 Apr 2020 15:15)      SUBJECTIVE / OVERNIGHT EVENTS: Pt is feeling better, still gets tachy with ambulation but to a lesser degree than yesterday, did not desaturate with ambulation. His appetite is better. Denies anymore chest pain, nausea, vomiting or abdominal pain.     ADDITIONAL REVIEW OF SYSTEMS:    RESPIRATORY: No cough, wheezing, chills or hemoptysis; No shortness of breath  CARDIOVASCULAR: No chest pain, palpitations, dizziness, or leg swelling  GASTROINTESTINAL: No abdominal or epigastric pain. No nausea, vomiting, or hematemesis; No diarrhea or constipation. No melena or hematochezia.    MEDICATIONS  (STANDING):  apixaban 10 milliGRAM(s) Oral every 12 hours    MEDICATIONS  (PRN):    I&O's Summary      PHYSICAL EXAM:  T(C): 37.2 (04-16-20 @ 21:33), Max: 37.2 (04-16-20 @ 21:33)  T(F): 99 (04-16-20 @ 21:33), Max: 99 (04-16-20 @ 21:33)  HR: 112 (04-16-20 @ 21:33) (112 - 112)  BP: 126/77 (04-16-20 @ 21:33) (126/77 - 126/77)  RR: 18 (04-16-20 @ 21:33) (18 - 18)  SpO2: 95% (04-16-20 @ 21:33) (95% - 95%)  Wt(kg): --  CONSTITUTIONAL: NAD, well-developed, well-groomed  EYES: PERRLA; conjunctiva and sclera clear  ENMT: Moist oral mucosa, no pharyngeal injection or exudates;   NECK: Supple, no palpable masses;  RESPIRATORY: Normal respiratory effort; basilar crackles bilaterally  CARDIOVASCULAR: Regular rate and rhythm, normal S1 and S2, no murmur/rub/gallop; No lower extremity edema; Peripheral pulses are 2+ bilaterally  ABDOMEN: Nontender to palpation, normoactive bowel sounds, no rebound/guarding;   MUSCLOSKELETAL:  Normal gait; no clubbing or cyanosis of digits; no joint swelling or tenderness to palpation  PSYCH: A+O to person, place, and time; affect appropriate  NEUROLOGY: CN 2-12 are intact and symmetric; no gross sensory deficits;   SKIN: No rashes; no palpable lesions    LABS:                        12.7   7.30  )-----------( 300      ( 16 Apr 2020 08:46 )             39.7   04-16    133<L>  |  97<L>  |  9   ----------------------------<  100<H>  4.2   |  27  |  0.86    Ca    9.3      16 Apr 2020 08:46  Phos  3.6     04-16  Mg     2.3     04-16    TPro  7.8  /  Alb  3.2<L>  /  TBili  0.5  /  DBili  x   /  AST  47<H>  /  ALT  34  /  AlkPhos  60  04-16             PTT - ( 14 Apr 2020 11:47 )  PTT:73.8 SEC                RADIOLOGY & ADDITIONAL TESTS:  < from: Transthoracic Echocardiogram (04.13.20 @ 12:20) >  CONCLUSIONS:  1. Normal left ventricular internal dimensions and wall  thicknesses.  2. Endocardium not well visualized; grossly normal left  ventricular systolic function. Unable to evaluate for small  segmental wall motion abnormality.  3. The right ventricle is not well visualized; grossly  normal right ventricular systolic function.  ------------------------------------------------------------------------  Confirmed on  4/13/2020 - 14:35:56 by ALEXANDRE Jewell    < end of copied text >    Results Reviewed:   Imaging Personally Reviewed:  Electrocardiogram Personally Reviewed:    COORDINATION OF CARE:  Care Discussed with Consultants/Other Providers [Y/N]:  Prior or Outpatient Records Reviewed [Y/N]:

## 2020-04-17 NOTE — DISCHARGE NOTE NURSING/CASE MANAGEMENT/SOCIAL WORK - PATIENT PORTAL LINK FT
You can access the FollowMyHealth Patient Portal offered by [facility] by registering at the following website: [email or web address]. By joining [employer]’s FollowMyHealth portal, you will also be able to view your health information using other applications (apps) compatible with our system.

## 2020-04-17 NOTE — PROGRESS NOTE ADULT - ASSESSMENT
49M with psoriasis on monthly immunosuppressant injections presented with SOB and cough found to have saddle PE and likely COVID-19.    Saddle PE, extensive LLE DVT  - Remains normotensive and asymptomatic at rest; however, feels better this AM  - likely in setting of COVID; although swab x2 was negative, he has known exposure and CT scan findings consistent w/ COVID infection  - neg troponins, no RV strain noted on echocardiogram  - s/p hep gtt from 4/10-4/14, now on eliquis 10mg BID  - c/w Eliquis, being loaded at this time  - Ok for discharge from our perspective  - He can follow up with me via TeleHealth for routine evaluation in one 1 week (he can call office at [phone] to arrange TeleHealth appointment).

## 2020-04-17 NOTE — PROGRESS NOTE ADULT - PROBLEM SELECTOR PLAN 2
Patient presents with complaint of fever, shortness of breath, and diarrhea.   - COVID-19 PCR is negative x2. ,   - ID- rec isolation as imaging is typical for COVID. Neg test likely d/t low viral load in nasal pharynx   - D/c today

## 2020-04-21 LAB
CULTURE RESULTS: SIGNIFICANT CHANGE UP
CULTURE RESULTS: SIGNIFICANT CHANGE UP
SPECIMEN SOURCE: SIGNIFICANT CHANGE UP
SPECIMEN SOURCE: SIGNIFICANT CHANGE UP

## 2020-04-22 PROBLEM — L40.9 PSORIASIS, UNSPECIFIED: Chronic | Status: ACTIVE | Noted: 2020-04-09

## 2020-04-23 ENCOUNTER — APPOINTMENT (OUTPATIENT)
Dept: CARDIOLOGY | Facility: CLINIC | Age: 50
End: 2020-04-23
Payer: SELF-PAY

## 2020-04-23 DIAGNOSIS — Z87.2 PERSONAL HISTORY OF DISEASES OF THE SKIN AND SUBCUTANEOUS TISSUE: ICD-10-CM

## 2020-04-23 DIAGNOSIS — Z87.898 PERSONAL HISTORY OF OTHER SPECIFIED CONDITIONS: ICD-10-CM

## 2020-04-23 PROCEDURE — 99205 OFFICE O/P NEW HI 60 MIN: CPT | Mod: 95

## 2020-05-05 ENCOUNTER — APPOINTMENT (OUTPATIENT)
Dept: CARDIOLOGY | Facility: CLINIC | Age: 50
End: 2020-05-05
Payer: COMMERCIAL

## 2020-05-05 PROCEDURE — 99215 OFFICE O/P EST HI 40 MIN: CPT | Mod: 95

## 2020-05-05 RX ORDER — DEXTROMETHORPHAN HBR, GUAIFENESIN 20; 400 MG/20ML; MG/20ML
20-400 SOLUTION ORAL EVERY 4 HOURS
Qty: 1 | Refills: 0 | Status: ACTIVE | COMMUNITY
Start: 2020-05-05 | End: 1900-01-01

## 2020-05-05 NOTE — CHART NOTE - NSCHARTNOTEFT_GEN_A_CORE
I, Bronwyn Jackson MD attest that, to the best of my knowledge based on clinical presentation and findings documented in the medical record, this patient had COVID-19 infection despite a negative COVID-19 PCR result: labs with elevated D-Dimer, LDH, ferritin, and CRP, CXR consistent with pneumonia due to COVID-19, and CTA demonstrated multiple pulmonary emboli as well as ground glass opacities.

## 2020-05-20 ENCOUNTER — APPOINTMENT (OUTPATIENT)
Dept: CARDIOLOGY | Facility: CLINIC | Age: 50
End: 2020-05-20
Payer: COMMERCIAL

## 2020-05-20 DIAGNOSIS — J12.9 VIRAL PNEUMONIA, UNSPECIFIED: ICD-10-CM

## 2020-05-20 PROCEDURE — 99215 OFFICE O/P EST HI 40 MIN: CPT | Mod: 95

## 2020-05-20 NOTE — REASON FOR VISIT
[FreeTextEntry1] : Consent obtained.  [employer] platform used.\par \par Patient is feeling better since being discharged from the hospital and since his last telemedicine visit on 05/05/20.  Although COVID negative in the hospital, he was identified to have COVID a day after discharge at an [facility].  His hospitalization course was complicated by LE DVT and acute saddle pulmonary embolism.  He still notes symptoms of exertional dyspnea after walking 1 block, which is improved compared to prior to admission.  He reports having no other cardiopulmonary complaints.  Reports compliance with Eliquis and is now taking 5 mg BID.  No bleeding issues.\par \par He was advised to continue with anticoagulation, and we will re-evaluate in 1 month.  Duration of anticoagulation therapy probably at least 6 months, and likely will require extended therapy as well.\par \par Asking for completion of disability forms, FMLA, extension of excuse from work.  Note written.  \par \par Total time spent on telemedicine and chart review and preparation of notes while reviewing with patient >45 minutes.\par \par \par Patient JESSICA YAPision MRN [id number] Hospital Visit [id number] [facility] - Attending Physician Keke Jorgensen \par Status Complete \par \par Hospital Course: \par Discharge Date	17-Apr-2020 \par Admission Date	10-Apr-2020 00:13 \par Reason for Admission	Shortness of breath \par Hospital Course	 \par 49M PMH psoriasis on monthly immunosuppressant injections of who presents with \par fever, shortness of breath, diarrhea. concern for COVID-19 infection, CTA chest \par also with saddle pulmonary emboli, admitted for further management. \par \par Hospital course: \par Pt admitted for PNA - Suspected 2019 novel coronavirus infection.  COVID negx2 \par but CT with COVID pattern. Pt was given IV ceftriaxone/azithromycin for \par possible bacterial PNA. \par Pt also found to have acute saddle pulmonary embolism without acute cor \par pulmonale. CTA chest with extensive bilateral pulmonary emboli with saddle \par thrombus identified. currently HD stable. mild hypoxia. TTE without RV strain. \par Pt was on heparin drip then transitioned to Eliquis. Pt will need f/u with PCP \par for hypercoagulability w/u and age appropriate cancer screening. \par \par Bilateral Venous Doppler 4/17: \par 1. Extensive, non-occlusive deep vein thromboses noted \par within the left common femoral, femoral, popliteal, \par posterior tibial, and peroneal veins, which all appear \par dilated with loss of full compressibility.  The hypoechoic \par nature of the thrombi suggests that this as an acute \par episode. \par 2.  No evidence of deep vein thrombosis in the right lower \par extremity. \par \par \par Med Reconciliation: \par Medication Reconciliation Status	Admission Reconciliation is Completed \par Discharge Reconciliation is Completed \par Discharge Medications	apixaban 5 mg oral tablet: 2 tab(s) orally every 12 hours \par \par \par Care Plan/Procedures: \par Goal(s)	To get better and follow your care plan as instructed. \par Discharge Diagnoses, Assessment and Plan of Treatment	PRINCIPAL DISCHARGE \par DIAGNOSIS \par Diagnosis: Acute saddle pulmonary embolism \par Assessment and Plan of Treatment: You were found to have a lung clot.  You had \par an echocardiogram done that was normal.  You were started on apixaban (Eliquis) \par loading dosage which is 10mg twice a day for 14 doses.  You have received 7 \par doses of the 14 during your hospitalization.  You will still require 7 more \par doses of 10mg twice a day.  Then, when finished, continue to take Eliquis 5mg \par twice per day. \par Loading dose schedule: \par In hospital:  7 doses of 10 mg twice a day completed. \par 4/18 - - 10 mg (dose 1) in morning \par 4/18 - - 10 mg (dose 2)  in evening \par 4/19  - - 10mg (dose 3) in morning \par 4/19 - -  10mg (dose 4) in evening \par 4/20 - - 10 mg (dose 5) in morning \par 4/20 - -  10 mg (dose 6)  in evening \par 4/21 - -  10mg (dose 7) in morning \par 4/21 - -  Start 5 mg evening of 4/21 and thereafter continue 5 mg once in the \par morning and once in the evening. \par \par \par \par SECONDARY DISCHARGE DIAGNOSES \par Diagnosis: Acute deep vein thrombosis \par Assessment and Plan of Treatment: You were found to have an extensive left \par lower extremity deep vein thrombosis.  Continue medication regimen as noted for \par pulmonary embolism as the treatment is the same. Avoid massaging left leg as \par this could dislodge clot.  Elevate legs as this will promote blood flow from \par the lower extremities to your heart. \par You will need to follow up with your primary care physician and/or hematologist \par for further outpatient workup to identify the cause of the blood clot. \par \par \par Diagnosis: Acute saddle pulmonary embolism without acute cor pulmonale \par Assessment and Plan of Treatment: You were found to have a lung clot. You had \par an echo done that was normal. You were started on Eliquis (blood thinner). \par Please follow up with your PCP for hypercoagulability workup and age \par appropriate cancer screening. \par

## 2020-05-20 NOTE — REVIEW OF SYSTEMS
[Dyspnea on exertion] : dyspnea during exertion [Shortness Of Breath] : shortness of breath [Negative] : Heme/Lymph

## 2020-05-27 ENCOUNTER — APPOINTMENT (OUTPATIENT)
Dept: CARDIOLOGY | Facility: CLINIC | Age: 50
End: 2020-05-27

## 2020-06-25 ENCOUNTER — OUTPATIENT (OUTPATIENT)
Dept: OUTPATIENT SERVICES | Facility: HOSPITAL | Age: 50
LOS: 1 days | End: 2020-06-25
Payer: COMMERCIAL

## 2020-06-25 ENCOUNTER — NON-APPOINTMENT (OUTPATIENT)
Age: 50
End: 2020-06-25

## 2020-06-25 ENCOUNTER — APPOINTMENT (OUTPATIENT)
Dept: CARDIOLOGY | Facility: CLINIC | Age: 50
End: 2020-06-25
Payer: COMMERCIAL

## 2020-06-25 ENCOUNTER — APPOINTMENT (OUTPATIENT)
Dept: CV DIAGNOSITCS | Facility: HOSPITAL | Age: 50
End: 2020-06-25

## 2020-06-25 VITALS
SYSTOLIC BLOOD PRESSURE: 141 MMHG | TEMPERATURE: 98.3 F | RESPIRATION RATE: 20 BRPM | WEIGHT: 235 LBS | DIASTOLIC BLOOD PRESSURE: 102 MMHG | HEART RATE: 125 BPM | OXYGEN SATURATION: 97 % | HEIGHT: 67 IN | BODY MASS INDEX: 36.88 KG/M2

## 2020-06-25 DIAGNOSIS — I42.9 CARDIOMYOPATHY, UNSPECIFIED: ICD-10-CM

## 2020-06-25 DIAGNOSIS — Z00.00 ENCOUNTER FOR GENERAL ADULT MEDICAL EXAMINATION W/OUT ABNORMAL FINDINGS: ICD-10-CM

## 2020-06-25 PROCEDURE — 99214 OFFICE O/P EST MOD 30 MIN: CPT

## 2020-06-25 PROCEDURE — 93306 TTE W/DOPPLER COMPLETE: CPT | Mod: 26

## 2020-06-25 PROCEDURE — 93000 ELECTROCARDIOGRAM COMPLETE: CPT

## 2020-06-26 LAB
ALBUMIN SERPL ELPH-MCNC: 5.1 G/DL
ALP BLD-CCNC: 83 U/L
ALT SERPL-CCNC: 43 U/L
ANION GAP SERPL CALC-SCNC: 17 MMOL/L
AST SERPL-CCNC: 54 U/L
BASOPHILS # BLD AUTO: 0.05 K/UL
BASOPHILS NFR BLD AUTO: 0.8 %
BILIRUB SERPL-MCNC: 0.4 MG/DL
BUN SERPL-MCNC: 12 MG/DL
CALCIUM SERPL-MCNC: 10.4 MG/DL
CHLORIDE SERPL-SCNC: 99 MMOL/L
CHOLEST SERPL-MCNC: 168 MG/DL
CHOLEST/HDLC SERPL: 4 RATIO
CO2 SERPL-SCNC: 25 MMOL/L
CREAT SERPL-MCNC: 0.96 MG/DL
DEPRECATED D DIMER PPP IA-ACNC: 212 NG/ML DDU
EOSINOPHIL # BLD AUTO: 0.2 K/UL
EOSINOPHIL NFR BLD AUTO: 3.2 %
ESTIMATED AVERAGE GLUCOSE: 100 MG/DL
GLUCOSE SERPL-MCNC: 91 MG/DL
HBA1C MFR BLD HPLC: 5.1 %
HCT VFR BLD CALC: 51.5 %
HDLC SERPL-MCNC: 43 MG/DL
HGB BLD-MCNC: 16.1 G/DL
IMM GRANULOCYTES NFR BLD AUTO: 0.2 %
LDLC SERPL CALC-MCNC: 102 MG/DL
LYMPHOCYTES # BLD AUTO: 2.3 K/UL
LYMPHOCYTES NFR BLD AUTO: 36.7 %
MAN DIFF?: NORMAL
MCHC RBC-ENTMCNC: 28.5 PG
MCHC RBC-ENTMCNC: 31.3 GM/DL
MCV RBC AUTO: 91.3 FL
MONOCYTES # BLD AUTO: 0.41 K/UL
MONOCYTES NFR BLD AUTO: 6.5 %
NEUTROPHILS # BLD AUTO: 3.29 K/UL
NEUTROPHILS NFR BLD AUTO: 52.6 %
PLATELET # BLD AUTO: 319 K/UL
POTASSIUM SERPL-SCNC: 4.1 MMOL/L
PROT SERPL-MCNC: 8.7 G/DL
RBC # BLD: 5.64 M/UL
RBC # FLD: 13.9 %
SARS-COV-2 IGG SERPL IA-ACNC: 7.69 INDEX
SARS-COV-2 IGG SERPL QL IA: POSITIVE
SODIUM SERPL-SCNC: 140 MMOL/L
TRIGL SERPL-MCNC: 118 MG/DL
TSH SERPL-ACNC: 2.42 UIU/ML
WBC # FLD AUTO: 6.26 K/UL

## 2020-06-28 PROBLEM — Z00.00 ENCOUNTER FOR PREVENTIVE HEALTH EXAMINATION: Status: ACTIVE | Noted: 2020-04-23

## 2020-07-20 ENCOUNTER — APPOINTMENT (OUTPATIENT)
Dept: CARDIOLOGY | Facility: CLINIC | Age: 50
End: 2020-07-20

## 2020-08-07 ENCOUNTER — APPOINTMENT (OUTPATIENT)
Dept: CV DIAGNOSITCS | Facility: HOSPITAL | Age: 50
End: 2020-08-07
Payer: MEDICAID

## 2020-08-07 ENCOUNTER — OUTPATIENT (OUTPATIENT)
Dept: OUTPATIENT SERVICES | Facility: HOSPITAL | Age: 50
LOS: 1 days | End: 2020-08-07

## 2020-08-07 DIAGNOSIS — I42.9 CARDIOMYOPATHY, UNSPECIFIED: ICD-10-CM

## 2020-08-07 PROCEDURE — 93970 EXTREMITY STUDY: CPT | Mod: 26

## 2020-08-07 PROCEDURE — 93306 TTE W/DOPPLER COMPLETE: CPT | Mod: 26

## 2020-08-12 ENCOUNTER — APPOINTMENT (OUTPATIENT)
Dept: CARDIOLOGY | Facility: CLINIC | Age: 50
End: 2020-08-12

## 2020-08-18 ENCOUNTER — OUTPATIENT (OUTPATIENT)
Dept: OUTPATIENT SERVICES | Facility: HOSPITAL | Age: 50
LOS: 1 days | Discharge: HOME | End: 2020-08-18

## 2020-08-18 ENCOUNTER — APPOINTMENT (OUTPATIENT)
Dept: CARDIOLOGY | Facility: HOSPITAL | Age: 50
End: 2020-08-18

## 2020-08-18 VITALS
BODY MASS INDEX: 39.16 KG/M2 | OXYGEN SATURATION: 96 % | TEMPERATURE: 97.5 F | HEIGHT: 67 IN | SYSTOLIC BLOOD PRESSURE: 137 MMHG | DIASTOLIC BLOOD PRESSURE: 94 MMHG | HEART RATE: 90 BPM

## 2020-08-18 VITALS — BODY MASS INDEX: 39.16 KG/M2 | WEIGHT: 250 LBS

## 2020-08-18 DIAGNOSIS — I82.409 ACUTE EMBOLISM AND THROMBOSIS OF UNSPECIFIED DEEP VEINS OF UNSPECIFIED LOWER EXTREMITY: ICD-10-CM

## 2020-08-18 DIAGNOSIS — Z20.828 CONTACT WITH AND (SUSPECTED) EXPOSURE TO OTHER VIRAL COMMUNICABLE DISEASES: ICD-10-CM

## 2020-08-18 DIAGNOSIS — Z78.9 OTHER SPECIFIED HEALTH STATUS: ICD-10-CM

## 2020-08-18 DIAGNOSIS — I26.92 SADDLE EMBOLUS OF PULMONARY ARTERY W/OUT ACUTE COR PULMONALE: ICD-10-CM

## 2020-08-18 DIAGNOSIS — F17.200 NICOTINE DEPENDENCE, UNSPECIFIED, UNCOMPLICATED: ICD-10-CM

## 2020-08-18 NOTE — ASSESSMENT
[FreeTextEntry1] : 48 yo M h/o psoriasis initially presented for follow up after hospitalization for COVID earlier this year complicated by saddle PE - TTE w/o e/o RH strain, discharged on apixaban. Patient has been followed by Dr. Velez, continues to experience shortness of breath, TTE and recent labs largely unremarkable. \par \par #SOB: may be contribution from PE however likely component of a post-COVID respiratory syndrome, does not appear to be cardiovascular in origin \par - continue to treat PE as below \par - discussed pulmonary referral with patient, he states that this was discussed w/ Dr. Velez at their last visit and is in the process of being completed, will reach out to Dr. Velez and discuss \par \par #DVT/PE: \par - continue on apixaban 5mg bid for at least 6 months \par \par #Disability Forms: per patient forms were faxed/emailed recently to Dr. Velez, patient does not have forms with him today \par - will reach out to Dr. Velez to discuss further

## 2020-08-18 NOTE — REASON FOR VISIT
[Follow-Up - Clinic] : a clinic follow-up of [FreeTextEntry1] : 50 yo M h/o psoriasis initially presented for follow up after hospitalization for COVID earlier this year complicated by saddle PE - TTE w/o e/o RH strain, discharged on apixaban. Patient has been followed by Dr. Velez with good follow up and medication compliance, recent TTE largely unremarkable, duplex showing chronic LLE DVT. \par \par Today patient states that he continues to have shortness of breath with minimal exertion which has been present since his hospitalization earlier this year despite compliance with medication. Also w/ occasional non productive cough. Denies f/c n/v wheezing chest pain diziness/syncope palpitations. \par \par Patient is also concerned about disability paperwork which he states has been sent to our clinic, does not have with him today.

## 2020-08-18 NOTE — PHYSICAL EXAM
[Normal Appearance] : normal appearance [Normal Jugular Venous A Waves Present] : normal jugular venous A waves present [Normal Jugular Venous V Waves Present] : normal jugular venous V waves present [Respiration, Rhythm And Depth] : normal respiratory rhythm and effort [] : no respiratory distress [Auscultation Breath Sounds / Voice Sounds] : lungs were clear to auscultation bilaterally [Heart Rate And Rhythm] : heart rate and rhythm were normal [Heart Sounds] : normal S1 and S2 [Murmurs] : no murmurs present [Abdomen Soft] : soft [Abdomen Tenderness] : non-tender [Petechial Hemorrhages (___cm)] : no petechial hemorrhages [Abnormal Walk] : normal gait [Oriented To Time, Place, And Person] : oriented to person, place, and time

## 2020-08-18 NOTE — REVIEW OF SYSTEMS
[Negative] : Heme/Lymph [Cough] : cough [Wheezing] : no wheezing [Coughing Up Blood] : no hemoptysis

## 2020-09-10 ENCOUNTER — RECORD ABSTRACTING (OUTPATIENT)
Age: 50
End: 2020-09-10

## 2020-10-07 ENCOUNTER — APPOINTMENT (OUTPATIENT)
Dept: CARDIOLOGY | Facility: CLINIC | Age: 50
End: 2020-10-07
Payer: MEDICAID

## 2020-10-07 VITALS
TEMPERATURE: 97.3 F | HEART RATE: 101 BPM | HEIGHT: 67 IN | WEIGHT: 250 LBS | BODY MASS INDEX: 39.24 KG/M2 | RESPIRATION RATE: 20 BRPM | DIASTOLIC BLOOD PRESSURE: 87 MMHG | OXYGEN SATURATION: 97 % | SYSTOLIC BLOOD PRESSURE: 134 MMHG

## 2020-10-07 PROCEDURE — 99214 OFFICE O/P EST MOD 30 MIN: CPT

## 2020-10-07 PROCEDURE — 93000 ELECTROCARDIOGRAM COMPLETE: CPT

## 2020-10-07 PROCEDURE — 36415 COLL VENOUS BLD VENIPUNCTURE: CPT

## 2020-10-07 RX ORDER — APIXABAN 5 MG/1
5 TABLET, FILM COATED ORAL
Qty: 180 | Refills: 3 | Status: ACTIVE | COMMUNITY
Start: 1900-01-01 | End: 1900-01-01

## 2020-10-08 LAB
BASOPHILS # BLD AUTO: 0.04 K/UL
BASOPHILS NFR BLD AUTO: 0.7 %
DEPRECATED D DIMER PPP IA-ACNC: <150 NG/ML DDU
EOSINOPHIL # BLD AUTO: 0.13 K/UL
EOSINOPHIL NFR BLD AUTO: 2.4 %
HCT VFR BLD CALC: 46.9 %
HGB BLD-MCNC: 14.8 G/DL
IMM GRANULOCYTES NFR BLD AUTO: 0.2 %
LYMPHOCYTES # BLD AUTO: 2.09 K/UL
LYMPHOCYTES NFR BLD AUTO: 38.6 %
MAN DIFF?: NORMAL
MCHC RBC-ENTMCNC: 29 PG
MCHC RBC-ENTMCNC: 31.6 GM/DL
MCV RBC AUTO: 92 FL
MONOCYTES # BLD AUTO: 0.38 K/UL
MONOCYTES NFR BLD AUTO: 7 %
NEUTROPHILS # BLD AUTO: 2.76 K/UL
NEUTROPHILS NFR BLD AUTO: 51.1 %
PLATELET # BLD AUTO: 261 K/UL
RBC # BLD: 5.1 M/UL
RBC # FLD: 14.9 %
WBC # FLD AUTO: 5.41 K/UL

## 2020-11-19 ENCOUNTER — APPOINTMENT (OUTPATIENT)
Dept: CARDIOLOGY | Facility: CLINIC | Age: 50
End: 2020-11-19

## 2020-12-02 ENCOUNTER — APPOINTMENT (OUTPATIENT)
Dept: CARDIOLOGY | Facility: CLINIC | Age: 50
End: 2020-12-02

## 2021-01-14 ENCOUNTER — APPOINTMENT (OUTPATIENT)
Dept: CARDIOLOGY | Facility: CLINIC | Age: 51
End: 2021-01-14
Payer: MEDICAID

## 2021-01-14 VITALS
OXYGEN SATURATION: 98 % | TEMPERATURE: 98.7 F | BODY MASS INDEX: 43.47 KG/M2 | WEIGHT: 277 LBS | HEIGHT: 67 IN | HEART RATE: 92 BPM | SYSTOLIC BLOOD PRESSURE: 143 MMHG | DIASTOLIC BLOOD PRESSURE: 86 MMHG

## 2021-01-14 DIAGNOSIS — I42.9 CARDIOMYOPATHY, UNSPECIFIED: ICD-10-CM

## 2021-01-14 DIAGNOSIS — I26.02 SADDLE EMBOLUS OF PULMONARY ARTERY WITH ACUTE COR PULMONALE: ICD-10-CM

## 2021-01-14 DIAGNOSIS — I82.412 ACUTE EMBOLISM AND THROMBOSIS OF LEFT FEMORAL VEIN: ICD-10-CM

## 2021-01-14 DIAGNOSIS — I82.409 ACUTE EMBOLISM AND THROMBOSIS OF UNSPECIFIED DEEP VEINS OF UNSPECIFIED LOWER EXTREMITY: ICD-10-CM

## 2021-01-14 DIAGNOSIS — Z51.81 ENCOUNTER FOR THERAPEUTIC DRUG LVL MONITORING: ICD-10-CM

## 2021-01-14 DIAGNOSIS — U07.1 COVID-19: ICD-10-CM

## 2021-01-14 DIAGNOSIS — Z79.01 ENCOUNTER FOR THERAPEUTIC DRUG LVL MONITORING: ICD-10-CM

## 2021-01-14 PROCEDURE — 99215 OFFICE O/P EST HI 40 MIN: CPT

## 2021-01-14 PROCEDURE — 99072 ADDL SUPL MATRL&STAF TM PHE: CPT

## 2021-01-14 PROCEDURE — 93000 ELECTROCARDIOGRAM COMPLETE: CPT

## 2021-01-14 PROCEDURE — 36415 COLL VENOUS BLD VENIPUNCTURE: CPT

## 2021-01-15 LAB
ALBUMIN SERPL ELPH-MCNC: 4.8 G/DL
ALP BLD-CCNC: 89 U/L
ALT SERPL-CCNC: 53 U/L
ANION GAP SERPL CALC-SCNC: 14 MMOL/L
AST SERPL-CCNC: 47 U/L
BASOPHILS # BLD AUTO: 0.05 K/UL
BASOPHILS NFR BLD AUTO: 0.8 %
BILIRUB SERPL-MCNC: 0.4 MG/DL
BUN SERPL-MCNC: 18 MG/DL
CALCIUM SERPL-MCNC: 9 MG/DL
CHLORIDE SERPL-SCNC: 102 MMOL/L
CHOLEST SERPL-MCNC: 182 MG/DL
CO2 SERPL-SCNC: 24 MMOL/L
CREAT SERPL-MCNC: 1.05 MG/DL
DEPRECATED D DIMER PPP IA-ACNC: <150 NG/ML DDU
EOSINOPHIL # BLD AUTO: 0.16 K/UL
EOSINOPHIL NFR BLD AUTO: 2.6 %
ESTIMATED AVERAGE GLUCOSE: 123 MG/DL
GLUCOSE SERPL-MCNC: 72 MG/DL
HBA1C MFR BLD HPLC: 5.9 %
HCT VFR BLD CALC: 46.3 %
HDLC SERPL-MCNC: 44 MG/DL
HGB BLD-MCNC: 14.9 G/DL
IMM GRANULOCYTES NFR BLD AUTO: 0.2 %
LDLC SERPL CALC-MCNC: 102 MG/DL
LYMPHOCYTES # BLD AUTO: 2.42 K/UL
LYMPHOCYTES NFR BLD AUTO: 38.7 %
MAN DIFF?: NORMAL
MCHC RBC-ENTMCNC: 30 PG
MCHC RBC-ENTMCNC: 32.2 GM/DL
MCV RBC AUTO: 93.2 FL
MONOCYTES # BLD AUTO: 0.48 K/UL
MONOCYTES NFR BLD AUTO: 7.7 %
NEUTROPHILS # BLD AUTO: 3.14 K/UL
NEUTROPHILS NFR BLD AUTO: 50 %
NONHDLC SERPL-MCNC: 138 MG/DL
PLATELET # BLD AUTO: 254 K/UL
POTASSIUM SERPL-SCNC: 4.6 MMOL/L
PROT SERPL-MCNC: 7.6 G/DL
RBC # BLD: 4.97 M/UL
RBC # FLD: 13.6 %
SODIUM SERPL-SCNC: 140 MMOL/L
TRIGL SERPL-MCNC: 182 MG/DL
TSH SERPL-ACNC: 2.64 UIU/ML
WBC # FLD AUTO: 6.26 K/UL

## 2021-03-05 ENCOUNTER — APPOINTMENT (OUTPATIENT)
Dept: CV DIAGNOSITCS | Facility: HOSPITAL | Age: 51
End: 2021-03-05

## 2021-03-08 ENCOUNTER — APPOINTMENT (OUTPATIENT)
Dept: CV DIAGNOSITCS | Facility: HOSPITAL | Age: 51
End: 2021-03-08

## 2021-03-12 ENCOUNTER — APPOINTMENT (OUTPATIENT)
Dept: CV DIAGNOSITCS | Facility: HOSPITAL | Age: 51
End: 2021-03-12

## 2021-03-25 ENCOUNTER — APPOINTMENT (OUTPATIENT)
Dept: CARDIOLOGY | Facility: CLINIC | Age: 51
End: 2021-03-25

## 2025-04-27 NOTE — H&P ADULT - PROBLEM/PLAN-5
Patient arrived to ED with R eye redness, drainage and itchiness. Patient reports \"goopiness started on plane today\" no otc taken   DISPLAY PLAN FREE TEXT